# Patient Record
Sex: MALE | Race: ASIAN | Employment: FULL TIME | URBAN - METROPOLITAN AREA
[De-identification: names, ages, dates, MRNs, and addresses within clinical notes are randomized per-mention and may not be internally consistent; named-entity substitution may affect disease eponyms.]

---

## 2017-02-27 RX ORDER — PAROXETINE HYDROCHLORIDE 40 MG/1
TABLET, FILM COATED ORAL
Qty: 90 TABLET | Refills: 0 | Status: SHIPPED | OUTPATIENT
Start: 2017-02-27 | End: 2017-06-17

## 2017-06-13 ENCOUNTER — TELEPHONE (OUTPATIENT)
Dept: INTERNAL MEDICINE CLINIC | Facility: CLINIC | Age: 32
End: 2017-06-13

## 2017-06-13 NOTE — TELEPHONE ENCOUNTER
Pharmacy called in for refill on Rx Paroxetine 40 and RX Invokana 300MG please advise           Current outpatient prescriptions:   •  PAROXETINE HCL 40 MG Oral Tab, TAKE 1 TABLET BY MOUTH EVERY MORNING, Disp: 90 tablet, Rfl: 0

## 2017-06-17 NOTE — TELEPHONE ENCOUNTER
Refill Protocol Appointment Criteria  · Appointment scheduled in the past 6 months or in the next 3 months  Recent Visits       Provider Department Primary Dx    1 year ago Joi Ronquillo MD [facility], [place], [place] Generalized anxiety

## 2017-06-17 NOTE — TELEPHONE ENCOUNTER
Pt called & stated he already made appt with DR Ramesh Chavarria, he req 2 pills till his ov on monday  Pt is out of meds of paroxetine 40 mg.  rx pended. Pt LOV 4-7-16. pls advise, thanks.      Future Appointments  Date Time Provider Sarah Fuentes   6/19/

## 2017-06-19 ENCOUNTER — OFFICE VISIT (OUTPATIENT)
Dept: INTERNAL MEDICINE CLINIC | Facility: CLINIC | Age: 32
End: 2017-06-19

## 2017-06-19 VITALS
BODY MASS INDEX: 31.08 KG/M2 | DIASTOLIC BLOOD PRESSURE: 80 MMHG | SYSTOLIC BLOOD PRESSURE: 124 MMHG | RESPIRATION RATE: 18 BRPM | HEART RATE: 92 BPM | WEIGHT: 198 LBS | HEIGHT: 67 IN

## 2017-06-19 DIAGNOSIS — E78.00 PURE HYPERCHOLESTEROLEMIA: ICD-10-CM

## 2017-06-19 DIAGNOSIS — Z00.00 ANNUAL PHYSICAL EXAM: Primary | ICD-10-CM

## 2017-06-19 DIAGNOSIS — F41.1 GENERALIZED ANXIETY DISORDER: ICD-10-CM

## 2017-06-19 PROCEDURE — 99395 PREV VISIT EST AGE 18-39: CPT | Performed by: INTERNAL MEDICINE

## 2017-06-19 RX ORDER — PAROXETINE HYDROCHLORIDE 40 MG/1
TABLET, FILM COATED ORAL
Qty: 90 TABLET | Refills: 1 | Status: SHIPPED | OUTPATIENT
Start: 2017-06-19 | End: 2018-01-08

## 2017-06-19 NOTE — PROGRESS NOTES
HPI:    Patient ID: Carmen Ritter is a 36year old male. HPI Comments: Here for annual physical     Anxiety  This is a chronic problem. The current episode started more than 1 year ago. The problem occurs intermittently.  The problem has been gradually pulses. Exam reveals no gallop. No murmur heard. Pulmonary/Chest: Effort normal and breath sounds normal. No respiratory distress. He has no wheezes. He has no rales. Abdominal: Soft. Bowel sounds are normal. He exhibits no distension and no mass.  Ike Paul

## 2017-07-08 ENCOUNTER — LAB ENCOUNTER (OUTPATIENT)
Dept: LAB | Age: 32
End: 2017-07-08
Attending: INTERNAL MEDICINE
Payer: COMMERCIAL

## 2017-07-08 DIAGNOSIS — Z00.00 ANNUAL PHYSICAL EXAM: ICD-10-CM

## 2017-07-08 LAB
ALBUMIN SERPL BCP-MCNC: 4.6 G/DL (ref 3.5–4.8)
ALBUMIN/GLOB SERPL: 1.5 {RATIO} (ref 1–2)
ALP SERPL-CCNC: 83 U/L (ref 32–100)
ALT SERPL-CCNC: 37 U/L (ref 17–63)
ANION GAP SERPL CALC-SCNC: 10 MMOL/L (ref 0–18)
AST SERPL-CCNC: 29 U/L (ref 15–41)
BASOPHILS # BLD: 0 K/UL (ref 0–0.2)
BASOPHILS NFR BLD: 1 %
BILIRUB SERPL-MCNC: 0.8 MG/DL (ref 0.3–1.2)
BUN SERPL-MCNC: 11 MG/DL (ref 8–20)
BUN/CREAT SERPL: 11.7 (ref 10–20)
CALCIUM SERPL-MCNC: 9.6 MG/DL (ref 8.5–10.5)
CHLORIDE SERPL-SCNC: 101 MMOL/L (ref 95–110)
CHOLEST SERPL-MCNC: 224 MG/DL (ref 110–200)
CO2 SERPL-SCNC: 26 MMOL/L (ref 22–32)
CREAT SERPL-MCNC: 0.94 MG/DL (ref 0.5–1.5)
EOSINOPHIL # BLD: 0.3 K/UL (ref 0–0.7)
EOSINOPHIL NFR BLD: 6 %
ERYTHROCYTE [DISTWIDTH] IN BLOOD BY AUTOMATED COUNT: 13.5 % (ref 11–15)
GLOBULIN PLAS-MCNC: 3 G/DL (ref 2.5–3.7)
GLUCOSE SERPL-MCNC: 94 MG/DL (ref 70–99)
HCT VFR BLD AUTO: 49.4 % (ref 41–52)
HDLC SERPL-MCNC: 46 MG/DL
HGB BLD-MCNC: 16.9 G/DL (ref 13.5–17.5)
LDLC SERPL CALC-MCNC: 153 MG/DL (ref 0–99)
LYMPHOCYTES # BLD: 1.8 K/UL (ref 1–4)
LYMPHOCYTES NFR BLD: 33 %
MCH RBC QN AUTO: 28.9 PG (ref 27–32)
MCHC RBC AUTO-ENTMCNC: 34.2 G/DL (ref 32–37)
MCV RBC AUTO: 84.5 FL (ref 80–100)
MONOCYTES # BLD: 0.4 K/UL (ref 0–1)
MONOCYTES NFR BLD: 8 %
NEUTROPHILS # BLD AUTO: 2.9 K/UL (ref 1.8–7.7)
NEUTROPHILS NFR BLD: 53 %
NONHDLC SERPL-MCNC: 178 MG/DL
OSMOLALITY UR CALC.SUM OF ELEC: 283 MOSM/KG (ref 275–295)
PLATELET # BLD AUTO: 242 K/UL (ref 140–400)
PMV BLD AUTO: 9 FL (ref 7.4–10.3)
POTASSIUM SERPL-SCNC: 3.8 MMOL/L (ref 3.3–5.1)
PROT SERPL-MCNC: 7.6 G/DL (ref 5.9–8.4)
RBC # BLD AUTO: 5.84 M/UL (ref 4.5–5.9)
SODIUM SERPL-SCNC: 137 MMOL/L (ref 136–144)
TRIGL SERPL-MCNC: 126 MG/DL (ref 1–149)
WBC # BLD AUTO: 5.4 K/UL (ref 4–11)

## 2017-07-08 PROCEDURE — 80053 COMPREHEN METABOLIC PANEL: CPT

## 2017-07-08 PROCEDURE — 80061 LIPID PANEL: CPT

## 2017-07-08 PROCEDURE — 85025 COMPLETE CBC W/AUTO DIFF WBC: CPT

## 2017-07-08 PROCEDURE — 36415 COLL VENOUS BLD VENIPUNCTURE: CPT

## 2017-08-03 ENCOUNTER — TELEPHONE (OUTPATIENT)
Dept: INTERNAL MEDICINE CLINIC | Facility: CLINIC | Age: 32
End: 2017-08-03

## 2017-08-03 DIAGNOSIS — E78.00 ELEVATED CHOLESTEROL: Primary | ICD-10-CM

## 2017-08-03 NOTE — TELEPHONE ENCOUNTER
----- Message from Patricio Arevalo MD sent at 7/24/2017  9:48 AM CDT -----  Call pt; had sent him mychart result note and still not read it.

## 2017-08-03 NOTE — TELEPHONE ENCOUNTER
Patient Result Comments     Written by Catherine Flores MD on 7/10/2017  9:37 AM   GA Martínez,     Here are your labs.    Your chemistries were normal.   Your cholesterol levels are improving though still not at goal so continue with regular exercise, an

## 2017-08-12 NOTE — TELEPHONE ENCOUNTER
Reviewed lab results 7/8/17 with pt along with Dr Darby Escobar recommendations. Pt verbalized undestanding.

## 2018-01-08 RX ORDER — PAROXETINE HYDROCHLORIDE 40 MG/1
TABLET, FILM COATED ORAL
Qty: 90 TABLET | Refills: 1 | Status: SHIPPED | OUTPATIENT
Start: 2018-01-08 | End: 2018-08-06

## 2018-01-08 NOTE — TELEPHONE ENCOUNTER
Pharmacy requesting refill for     Current Outpatient Prescriptions:  PARoxetine HCl 40 MG Oral Tab TAKE 1 TABLET BY MOUTH EVERY MORNING Disp: 90 tablet Rfl: 1     Please Advise

## 2018-08-09 RX ORDER — PAROXETINE HYDROCHLORIDE 40 MG/1
TABLET, FILM COATED ORAL
Qty: 30 TABLET | Refills: 0 | Status: SHIPPED | OUTPATIENT
Start: 2018-08-09 | End: 2018-09-09

## 2018-09-10 RX ORDER — PAROXETINE HYDROCHLORIDE 40 MG/1
TABLET, FILM COATED ORAL
Qty: 30 TABLET | Refills: 0 | Status: SHIPPED | OUTPATIENT
Start: 2018-09-10 | End: 2018-10-12

## 2018-10-14 RX ORDER — PAROXETINE HYDROCHLORIDE 40 MG/1
TABLET, FILM COATED ORAL
Qty: 30 TABLET | Refills: 0 | Status: SHIPPED | OUTPATIENT
Start: 2018-10-14 | End: 2018-11-13

## 2018-11-13 RX ORDER — PAROXETINE HYDROCHLORIDE 40 MG/1
TABLET, FILM COATED ORAL
Qty: 30 TABLET | Refills: 0 | Status: SHIPPED | OUTPATIENT
Start: 2018-11-13 | End: 2018-12-17

## 2018-12-17 RX ORDER — PAROXETINE HYDROCHLORIDE 40 MG/1
TABLET, FILM COATED ORAL
Qty: 30 TABLET | Refills: 0 | Status: SHIPPED | OUTPATIENT
Start: 2018-12-17 | End: 2019-01-20

## 2019-01-21 NOTE — TELEPHONE ENCOUNTER
CSS, please contact patient and assist in scheduling overdue f/u or Px. Once scheduled or after 3 attempts please route back for refill review. [employer] message also sent.     Per TANNA's  12/17/18 message from last refill, pt needs appt:    Diana Luz

## 2019-01-24 RX ORDER — PAROXETINE HYDROCHLORIDE 40 MG/1
TABLET, FILM COATED ORAL
Qty: 30 TABLET | Refills: 0 | Status: SHIPPED | OUTPATIENT
Start: 2019-01-24 | End: 2019-02-24

## 2019-01-25 NOTE — TELEPHONE ENCOUNTER
Refill passed per [facility], [facility] protocol.     Refill Protocol Appointment Criteria  · Appointment scheduled in the past 6 months or in the next 3 months  Recent Outpatient Visits            1 year ago Annual physical exam    [address]

## 2019-02-25 RX ORDER — PAROXETINE HYDROCHLORIDE 40 MG/1
TABLET, FILM COATED ORAL
Qty: 30 TABLET | Refills: 0 | Status: SHIPPED | OUTPATIENT
Start: 2019-02-25 | End: 2019-04-05

## 2019-04-02 RX ORDER — PAROXETINE HYDROCHLORIDE 40 MG/1
TABLET, FILM COATED ORAL
Qty: 90 TABLET | Refills: 0 | OUTPATIENT
Start: 2019-04-02

## 2019-04-05 ENCOUNTER — OFFICE VISIT (OUTPATIENT)
Dept: INTERNAL MEDICINE CLINIC | Facility: CLINIC | Age: 34
End: 2019-04-05
Payer: COMMERCIAL

## 2019-04-05 VITALS
SYSTOLIC BLOOD PRESSURE: 118 MMHG | HEIGHT: 67 IN | OXYGEN SATURATION: 97 % | RESPIRATION RATE: 18 BRPM | HEART RATE: 81 BPM | TEMPERATURE: 98 F | WEIGHT: 200 LBS | DIASTOLIC BLOOD PRESSURE: 76 MMHG | BODY MASS INDEX: 31.39 KG/M2

## 2019-04-05 DIAGNOSIS — F41.1 GENERALIZED ANXIETY DISORDER: ICD-10-CM

## 2019-04-05 DIAGNOSIS — Z00.00 ANNUAL PHYSICAL EXAM: Primary | ICD-10-CM

## 2019-04-05 PROCEDURE — 99395 PREV VISIT EST AGE 18-39: CPT | Performed by: INTERNAL MEDICINE

## 2019-04-05 RX ORDER — PAROXETINE HYDROCHLORIDE 40 MG/1
TABLET, FILM COATED ORAL
Qty: 90 TABLET | Refills: 1 | Status: SHIPPED | OUTPATIENT
Start: 2019-04-05 | End: 2019-09-29

## 2019-04-05 NOTE — PROGRESS NOTES
HPI:    Patient ID: Yuan Batres is a 34year old male. Patient presents today for his annual physical.       Anxiety   This is a chronic problem. The current episode started more than 1 year ago. The problem has been gradually improving.  Pertinent ne encounter diagnosis)  Plan: CBC WITH DIFFERENTIAL WITH PLATELET, COMP         METABOLIC PANEL (14), LIPID PANEL        Routine labs ordered.  Pt's physical exam was normal.     (F41.1) Generalized anxiety disorder  Plan: pt had been doing well with paroxeti

## 2019-04-13 ENCOUNTER — LAB ENCOUNTER (OUTPATIENT)
Dept: LAB | Age: 34
End: 2019-04-13
Attending: INTERNAL MEDICINE
Payer: COMMERCIAL

## 2019-04-13 DIAGNOSIS — Z00.00 ANNUAL PHYSICAL EXAM: ICD-10-CM

## 2019-04-13 PROCEDURE — 85060 BLOOD SMEAR INTERPRETATION: CPT

## 2019-04-13 PROCEDURE — 36415 COLL VENOUS BLD VENIPUNCTURE: CPT

## 2019-04-13 PROCEDURE — 80053 COMPREHEN METABOLIC PANEL: CPT

## 2019-04-13 PROCEDURE — 85025 COMPLETE CBC W/AUTO DIFF WBC: CPT

## 2019-04-13 PROCEDURE — 80061 LIPID PANEL: CPT

## 2019-04-18 ENCOUNTER — TELEPHONE (OUTPATIENT)
Dept: INTERNAL MEDICINE CLINIC | Facility: CLINIC | Age: 34
End: 2019-04-18

## 2019-04-18 DIAGNOSIS — R71.8 ELEVATED RED BLOOD CELL COUNT: Primary | ICD-10-CM

## 2019-04-23 ENCOUNTER — TELEPHONE (OUTPATIENT)
Dept: OTHER | Age: 34
End: 2019-04-23

## 2019-04-23 NOTE — PROGRESS NOTES
Patient returning a call (verified name and ), advised Dr Davey Singh note and verbalized understanding.     Notes recorded by Sherren Roles, MD on 2019 at 7:52 AM CDT  Anytime this month

## 2019-05-04 ENCOUNTER — LAB ENCOUNTER (OUTPATIENT)
Dept: LAB | Age: 34
End: 2019-05-04
Attending: INTERNAL MEDICINE
Payer: COMMERCIAL

## 2019-05-04 DIAGNOSIS — R71.8 ELEVATED RED BLOOD CELL COUNT: ICD-10-CM

## 2019-05-04 PROCEDURE — 85025 COMPLETE CBC W/AUTO DIFF WBC: CPT

## 2019-05-04 PROCEDURE — 36415 COLL VENOUS BLD VENIPUNCTURE: CPT

## 2019-05-06 ENCOUNTER — TELEPHONE (OUTPATIENT)
Dept: INTERNAL MEDICINE CLINIC | Facility: CLINIC | Age: 34
End: 2019-05-06

## 2019-05-06 DIAGNOSIS — D75.1 ERYTHROCYTOSIS: Primary | ICD-10-CM

## 2019-09-29 RX ORDER — PAROXETINE HYDROCHLORIDE 40 MG/1
TABLET, FILM COATED ORAL
Qty: 90 TABLET | Refills: 1 | Status: SHIPPED | OUTPATIENT
Start: 2019-09-29 | End: 2020-04-03

## 2020-04-03 RX ORDER — PAROXETINE HYDROCHLORIDE 40 MG/1
TABLET, FILM COATED ORAL
Qty: 90 TABLET | Refills: 0 | Status: SHIPPED | OUTPATIENT
Start: 2020-04-03 | End: 2020-07-02

## 2020-07-02 ENCOUNTER — TELEPHONE (OUTPATIENT)
Dept: INTERNAL MEDICINE CLINIC | Facility: CLINIC | Age: 35
End: 2020-07-02

## 2020-07-02 RX ORDER — PAROXETINE HYDROCHLORIDE 40 MG/1
40 TABLET, FILM COATED ORAL EVERY MORNING
Qty: 30 TABLET | Refills: 0 | Status: SHIPPED | OUTPATIENT
Start: 2020-07-02 | End: 2020-08-05

## 2020-07-02 NOTE — TELEPHONE ENCOUNTER
Current Outpatient Medications:   •  PAROXETINE HCL 40 MG Oral Tab, TAKE 1 TABLET BY MOUTH EVERY MORNING, Disp: 90 tablet, Rfl: 0

## 2020-07-03 NOTE — TELEPHONE ENCOUNTER
Informed patient of note below. Patient voiced understanding and was unable to schedule appointment because he was driving but he will call back to schedule.

## 2020-07-28 NOTE — PROGRESS NOTES
HPI:    Patient ID: Luisa Bellamy is a 30year old male. Anxiety   This is a chronic problem. The current episode started more than 1 year ago. The problem occurs constantly. The problem has been waxing and waning. Nothing aggravates the symptoms.  He h has no cervical adenopathy. Neurological: He is alert and oriented to person, place, and time. Skin: Skin is warm and dry. No rash noted.    Psychiatric: His speech is normal and behavior is normal. Judgment and thought content normal. His mood appears

## 2020-08-05 ENCOUNTER — TELEPHONE (OUTPATIENT)
Dept: INTERNAL MEDICINE CLINIC | Facility: CLINIC | Age: 35
End: 2020-08-05

## 2020-08-05 NOTE — TELEPHONE ENCOUNTER
Pt states had discussed with Dr Danial Coello trying a different medication than Paroxetine. Per pt he doesn't remember which medication Dr Danial Coello recommended but would like to switch.     Please advise

## 2020-08-05 NOTE — TELEPHONE ENCOUNTER
Ok to switch to sertraline 50mg  One tablet po daily #90; call back one month for update how he is doing with new med since may need to adjust dose

## 2020-08-05 NOTE — TELEPHONE ENCOUNTER
Patient informed of provider's response/Rx that was sent to [place], as per below and patient voiced understating and agrees with all.

## 2020-08-10 ENCOUNTER — TELEPHONE (OUTPATIENT)
Dept: INTERNAL MEDICINE CLINIC | Facility: CLINIC | Age: 35
End: 2020-08-10

## 2020-08-10 NOTE — TELEPHONE ENCOUNTER
Spoke with patient ( verified)--reports he has been taking Zoloft 50 mg every morning as prescribed 2002, but after he gets to work, he experiences sweating and rapid heart rate every day--today as well-- for approximately 30 minutes, then resolves

## 2021-02-04 NOTE — PROGRESS NOTES
HPI:    Patient ID: Blu Bermeo is a 37year old male. Anxiety  This is a chronic problem. The current episode started more than 1 year ago. The problem occurs rarely. The problem has been gradually improving. Associated symptoms comments: none.  The icterus. Neck: Normal range of motion. Neck supple. No JVD present. No thyromegaly present. Cardiovascular: Normal rate, regular rhythm, normal heart sounds and intact distal pulses. Exam reveals no gallop and no friction rub. No murmur heard.   [facility]

## 2021-04-19 ENCOUNTER — TELEPHONE (OUTPATIENT)
Dept: INTERNAL MEDICINE CLINIC | Facility: CLINIC | Age: 36
End: 2021-04-19

## 2021-04-19 DIAGNOSIS — D75.1 ERYTHROCYTOSIS: Primary | ICD-10-CM

## 2021-04-19 DIAGNOSIS — E78.00 HYPERCHOLESTEREMIA: ICD-10-CM

## 2021-04-19 NOTE — TELEPHONE ENCOUNTER
I would have him do labs first since last lab done was 2 yrs ago already and would want to check if still persist or not. Lab order for his cbc in epic. I also wnt to check his cholesterol levels and cmp since elevated before. Thanks.

## 2021-04-19 NOTE — TELEPHONE ENCOUNTER
Left a detailed message to cell (ok per CRYSTAL) regarding note below. Advised to call back as needed.  Advised to fast and provided the phone # to schedule the labs

## 2021-04-19 NOTE — TELEPHONE ENCOUNTER
Patient requesting new referral to see Dr. Braeden Nielsen (Hematology)  Patient was referred back in 2019 but had no insurance coverage and unable to have consult. Please advise.

## 2021-04-26 ENCOUNTER — LAB ENCOUNTER (OUTPATIENT)
Dept: LAB | Age: 36
End: 2021-04-26
Attending: INTERNAL MEDICINE
Payer: COMMERCIAL

## 2021-04-26 DIAGNOSIS — E78.00 HYPERCHOLESTEREMIA: ICD-10-CM

## 2021-04-26 DIAGNOSIS — D75.1 ERYTHROCYTOSIS: ICD-10-CM

## 2021-04-26 PROCEDURE — 80061 LIPID PANEL: CPT

## 2021-04-26 PROCEDURE — 36415 COLL VENOUS BLD VENIPUNCTURE: CPT

## 2021-04-26 PROCEDURE — 85025 COMPLETE CBC W/AUTO DIFF WBC: CPT

## 2021-04-26 PROCEDURE — 80053 COMPREHEN METABOLIC PANEL: CPT

## 2021-08-03 ENCOUNTER — OFFICE VISIT (OUTPATIENT)
Dept: HEMATOLOGY/ONCOLOGY | Facility: HOSPITAL | Age: 36
End: 2021-08-03
Attending: INTERNAL MEDICINE
Payer: COMMERCIAL

## 2021-08-03 VITALS
RESPIRATION RATE: 18 BRPM | TEMPERATURE: 98 F | WEIGHT: 177 LBS | DIASTOLIC BLOOD PRESSURE: 72 MMHG | SYSTOLIC BLOOD PRESSURE: 134 MMHG | BODY MASS INDEX: 28.11 KG/M2 | OXYGEN SATURATION: 95 % | HEART RATE: 82 BPM | HEIGHT: 66.5 IN

## 2021-08-03 DIAGNOSIS — R68.3 CLUBBING OF FINGERS: ICD-10-CM

## 2021-08-03 DIAGNOSIS — D75.1 ERYTHROCYTOSIS: Primary | ICD-10-CM

## 2021-08-03 DIAGNOSIS — D75.1 ERYTHROCYTOSIS: ICD-10-CM

## 2021-08-03 LAB
BASOPHILS # BLD AUTO: 0.08 X10(3) UL (ref 0–0.2)
BASOPHILS NFR BLD AUTO: 1.5 %
DEPRECATED RDW RBC AUTO: 37 FL (ref 35.1–46.3)
EOSINOPHIL # BLD AUTO: 0.19 X10(3) UL (ref 0–0.7)
EOSINOPHIL NFR BLD AUTO: 3.6 %
ERYTHROCYTE [DISTWIDTH] IN BLOOD BY AUTOMATED COUNT: 12.1 % (ref 11–15)
HCT VFR BLD AUTO: 50.2 %
HGB BLD-MCNC: 17.5 G/DL
IMM GRANULOCYTES # BLD AUTO: 0.01 X10(3) UL (ref 0–1)
IMM GRANULOCYTES NFR BLD: 0.2 %
LYMPHOCYTES # BLD AUTO: 1.49 X10(3) UL (ref 1–4)
LYMPHOCYTES NFR BLD AUTO: 28.5 %
MCH RBC QN AUTO: 29.3 PG (ref 26–34)
MCHC RBC AUTO-ENTMCNC: 34.9 G/DL (ref 31–37)
MCV RBC AUTO: 83.9 FL
MONOCYTES # BLD AUTO: 0.37 X10(3) UL (ref 0.1–1)
MONOCYTES NFR BLD AUTO: 7.1 %
NEUTROPHILS # BLD AUTO: 3.09 X10 (3) UL (ref 1.5–7.7)
NEUTROPHILS # BLD AUTO: 3.09 X10(3) UL (ref 1.5–7.7)
NEUTROPHILS NFR BLD AUTO: 59.1 %
PLATELET # BLD AUTO: 236 10(3)UL (ref 150–450)
RBC # BLD AUTO: 5.98 X10(6)UL
WBC # BLD AUTO: 5.2 X10(3) UL (ref 4–11)

## 2021-08-03 PROCEDURE — 82668 ASSAY OF ERYTHROPOIETIN: CPT

## 2021-08-03 PROCEDURE — 36415 COLL VENOUS BLD VENIPUNCTURE: CPT

## 2021-08-03 PROCEDURE — 85025 COMPLETE CBC W/AUTO DIFF WBC: CPT

## 2021-08-03 PROCEDURE — 99244 OFF/OP CNSLTJ NEW/EST MOD 40: CPT | Performed by: INTERNAL MEDICINE

## 2021-08-03 RX ORDER — MULTIVITAMIN WITH IRON
1 TABLET ORAL DAILY
COMMUNITY

## 2021-08-03 RX ORDER — CHOLECALCIFEROL (VITAMIN D3) 50 MCG
TABLET ORAL DAILY
COMMUNITY

## 2021-08-04 NOTE — CONSULTS
[facility]    PATIENT'S NAME: Te Wright   CONSULTING PHYSICIAN: Gloris Meckel.  Raquel Hurtado MD   PATIENT ACCOUNT #: [de-identified] LOCATION: [address] [place] [address] RECORD #: [id number] YOB: 1985   CONSULTATION DATE: 08/03/2021       CANCER CENT pulse 82, respiratory rate 18, pulse ox 95% on room air. Temperature is 36.8. HEENT:  Moist mucous membranes. Oropharynx clear. NECK:  Supple. LUNGS:  Symmetric expansion nonlabored breathing. HEART:  Good distal pulses. Regular rate and rhythm.

## 2021-08-04 NOTE — PROGRESS NOTES
RN, please reach out to the patient to facilitate getting him an appointment to see me at some juncture within the next month or so.

## 2021-08-11 ENCOUNTER — TELEPHONE (OUTPATIENT)
Dept: PULMONOLOGY | Facility: CLINIC | Age: 36
End: 2021-08-11

## 2021-08-11 NOTE — TELEPHONE ENCOUNTER
Brook Torres MD  P  [facility] Clinical Staff  RN, please reach out to the patient to get him an appointment to see me in the office sometime within the next month or so.

## 2021-08-13 NOTE — TELEPHONE ENCOUNTER
Spoke to patient and appointment scheduled with Dr. Dianna Shelton 9/2/21 at 1 pm.  Verified date, time, place and parking. Patient verbalized understanding.      CS-please schedule Monday :)

## 2021-08-14 LAB — ERYTHROPOIETIN (EPO): 8 MU/ML

## 2021-08-17 ENCOUNTER — TELEPHONE (OUTPATIENT)
Dept: HEMATOLOGY/ONCOLOGY | Facility: HOSPITAL | Age: 36
End: 2021-08-17

## 2021-08-17 DIAGNOSIS — D75.1 ERYTHROCYTOSIS: Primary | ICD-10-CM

## 2021-08-17 NOTE — TELEPHONE ENCOUNTER
LM indicating normal epo level. Noted consult appointment with Dr Taiwo Grant as per Dr Dilip Lechuga recommendations. LM to please call clinic back for questions and also to schedule a follow up with Dr Jacquelin Mendiola in 4- 5 months with repeat cbc. Awaiting call back.

## 2021-08-18 ENCOUNTER — TELEPHONE (OUTPATIENT)
Dept: HEMATOLOGY/ONCOLOGY | Facility: HOSPITAL | Age: 36
End: 2021-08-18

## 2021-08-18 NOTE — TELEPHONE ENCOUNTER
Advised pt as per Dr Emiliana Haddad that his epo level came back normal.  Reinforced to pt that this result makes it even more important to follow up with pulmonology to determine if lowered oxygen levels in his blood are the cause of his elevated red blood cells.

## 2021-09-02 ENCOUNTER — OFFICE VISIT (OUTPATIENT)
Dept: PULMONOLOGY | Facility: CLINIC | Age: 36
End: 2021-09-02
Payer: COMMERCIAL

## 2021-09-02 VITALS
HEIGHT: 67 IN | WEIGHT: 180.19 LBS | OXYGEN SATURATION: 97 % | TEMPERATURE: 99 F | DIASTOLIC BLOOD PRESSURE: 76 MMHG | RESPIRATION RATE: 16 BRPM | BODY MASS INDEX: 28.28 KG/M2 | HEART RATE: 89 BPM | SYSTOLIC BLOOD PRESSURE: 130 MMHG

## 2021-09-02 DIAGNOSIS — R68.3 CLUBBING OF FINGERS: Primary | ICD-10-CM

## 2021-09-02 DIAGNOSIS — G47.33 OSA (OBSTRUCTIVE SLEEP APNEA): ICD-10-CM

## 2021-09-02 DIAGNOSIS — D75.1 POLYCYTHEMIA: ICD-10-CM

## 2021-09-02 PROCEDURE — 3075F SYST BP GE 130 - 139MM HG: CPT | Performed by: INTERNAL MEDICINE

## 2021-09-02 PROCEDURE — 3008F BODY MASS INDEX DOCD: CPT | Performed by: INTERNAL MEDICINE

## 2021-09-02 PROCEDURE — 99243 OFF/OP CNSLTJ NEW/EST LOW 30: CPT | Performed by: INTERNAL MEDICINE

## 2021-09-02 PROCEDURE — 3078F DIAST BP <80 MM HG: CPT | Performed by: INTERNAL MEDICINE

## 2021-09-02 NOTE — PROGRESS NOTES
Dear Delia Ronquillo:           As you know, Rona Mahan is a 45-year-old male who I am now evaluating for erythrocytosis and clubbing. HISTORY OF PRESENT ILLNESS: The patient is a history of dyslipidemia and anxiety; however, he is otherwise well.   He was recent Examination:  Vital signs normal. HEENT examination is unremarkable with pupils equal round and reactive to light and accommodation. Neck without adenopathy, thyromegaly, JVD nor bruit. Lungs clear to auscultation and percussion.  Cardiac regular rate and r

## 2021-09-04 ENCOUNTER — HOSPITAL ENCOUNTER (OUTPATIENT)
Dept: GENERAL RADIOLOGY | Age: 36
Discharge: HOME | End: 2021-09-04
Attending: INTERNAL MEDICINE
Payer: COMMERCIAL

## 2021-09-04 DIAGNOSIS — R68.3 CLUBBING OF FINGERS: ICD-10-CM

## 2021-09-04 DIAGNOSIS — D75.1 POLYCYTHEMIA: ICD-10-CM

## 2021-09-04 PROCEDURE — 71046 X-RAY EXAM CHEST 2 VIEWS: CPT | Performed by: INTERNAL MEDICINE

## 2021-09-13 ENCOUNTER — TELEPHONE (OUTPATIENT)
Dept: PULMONOLOGY | Facility: CLINIC | Age: 36
End: 2021-09-13

## 2021-09-13 DIAGNOSIS — G47.33 OSA (OBSTRUCTIVE SLEEP APNEA): Primary | ICD-10-CM

## 2021-09-13 NOTE — TELEPHONE ENCOUNTER
Spoke with Kaitlin Leonard at the sleep center regarding denial of diagnostic sleep study. Kaitlin Leonard states home sleep study would be approved. Dr. Cecilio Araujo- Please review/sign pended order for home sleep study if agreeable.

## 2021-09-13 NOTE — TELEPHONE ENCOUNTER
Pt called to let this office know that his sleep study was denied by insurance. Please to discuss other options.

## 2021-09-13 NOTE — TELEPHONE ENCOUNTER
Spoke with patient. Informed patient of Dr. April Whiting order below. Patient states he already scheduled home sleep study.

## 2022-02-04 NOTE — TELEPHONE ENCOUNTER
Refill passed per [address] protocol.     Requested Prescriptions   Pending Prescriptions Disp Refills    SERTRALINE 50 MG Oral Tab [Pharmacy Med Name: SERTRALINE 50MG TABLETS] 90 tablet 0     Sig: TAKE 1 TABLET(50 MG) BY MOUTH DAILY        Psychiatric Non-Scheduled (Anti-Anxiety) Passed - 2/4/2022  5:06 PM        Passed - Appointment in last 6 or next 3 months              Future Appointments         Provider Department Appt Notes    In 1 week Angelika Rodriguez MD [address] [facility] [address], [address], [address] *[place]            Recent Outpatient Visits              5 months ago Clubbing of fingers    [facility], [facility], Queenie Horta MD    Office Visit    6 months ago Erythrocytosis    [facility] Hematology Oncology    Nurse Only    6 months ago Erythrocytosis    [facility] Hematology Oncology Denver Lopes, MD    Office Visit    1 year ago Generalized anxiety disorder    Bo Do MD    Office Visit    1 year ago Generalized anxiety disorder    Bo Do MD    Office Visit

## 2022-02-16 ENCOUNTER — OFFICE VISIT (OUTPATIENT)
Dept: INTERNAL MEDICINE CLINIC | Facility: CLINIC | Age: 37
End: 2022-02-16
Payer: COMMERCIAL

## 2022-02-16 VITALS
SYSTOLIC BLOOD PRESSURE: 120 MMHG | OXYGEN SATURATION: 98 % | TEMPERATURE: 99 F | HEART RATE: 82 BPM | DIASTOLIC BLOOD PRESSURE: 68 MMHG | WEIGHT: 183.63 LBS | HEIGHT: 67 IN | BODY MASS INDEX: 28.82 KG/M2

## 2022-02-16 DIAGNOSIS — R68.3 CLUBBING OF NAILS: ICD-10-CM

## 2022-02-16 DIAGNOSIS — D75.1 ERYTHROCYTOSIS: ICD-10-CM

## 2022-02-16 DIAGNOSIS — E78.00 HYPERCHOLESTEREMIA: ICD-10-CM

## 2022-02-16 DIAGNOSIS — F41.1 GENERALIZED ANXIETY DISORDER: ICD-10-CM

## 2022-02-16 DIAGNOSIS — Z00.00 ANNUAL PHYSICAL EXAM: Primary | ICD-10-CM

## 2022-02-16 PROCEDURE — 99395 PREV VISIT EST AGE 18-39: CPT | Performed by: INTERNAL MEDICINE

## 2022-02-16 PROCEDURE — 3008F BODY MASS INDEX DOCD: CPT | Performed by: INTERNAL MEDICINE

## 2022-02-16 PROCEDURE — 3074F SYST BP LT 130 MM HG: CPT | Performed by: INTERNAL MEDICINE

## 2022-02-16 PROCEDURE — 3078F DIAST BP <80 MM HG: CPT | Performed by: INTERNAL MEDICINE

## 2022-02-16 PROCEDURE — 90471 IMMUNIZATION ADMIN: CPT | Performed by: INTERNAL MEDICINE

## 2022-02-16 PROCEDURE — 90686 IIV4 VACC NO PRSV 0.5 ML IM: CPT | Performed by: INTERNAL MEDICINE

## 2022-04-15 ENCOUNTER — NURSE TRIAGE (OUTPATIENT)
Dept: INTERNAL MEDICINE CLINIC | Facility: CLINIC | Age: 37
End: 2022-04-15

## 2022-04-15 NOTE — TELEPHONE ENCOUNTER
Patient indicated that Dr Velma Marrero gave him a cream in the office before for his eczema on his right arm/elbow. Patient is now having eczema on his left forearm and elbow. Does not have anymore cream left and not sure of the name. Please advise.

## 2022-06-08 ENCOUNTER — LAB ENCOUNTER (OUTPATIENT)
Dept: LAB | Age: 37
End: 2022-06-08
Attending: INTERNAL MEDICINE
Payer: COMMERCIAL

## 2022-06-08 DIAGNOSIS — D75.1 ERYTHROCYTOSIS: ICD-10-CM

## 2022-06-08 DIAGNOSIS — Z00.00 ANNUAL PHYSICAL EXAM: ICD-10-CM

## 2022-06-08 LAB
ALBUMIN SERPL-MCNC: 4 G/DL (ref 3.4–5)
ALBUMIN/GLOB SERPL: 1.2 {RATIO} (ref 1–2)
ALP LIVER SERPL-CCNC: 78 U/L
ALT SERPL-CCNC: 37 U/L
ANION GAP SERPL CALC-SCNC: 3 MMOL/L (ref 0–18)
AST SERPL-CCNC: 27 U/L (ref 15–37)
BASOPHILS # BLD AUTO: 0.06 X10(3) UL (ref 0–0.2)
BASOPHILS NFR BLD AUTO: 1.7 %
BILIRUB SERPL-MCNC: 0.8 MG/DL (ref 0.1–2)
BUN BLD-MCNC: 13 MG/DL (ref 7–18)
BUN/CREAT SERPL: 13.8 (ref 10–20)
CALCIUM BLD-MCNC: 9 MG/DL (ref 8.5–10.1)
CHLORIDE SERPL-SCNC: 108 MMOL/L (ref 98–112)
CHOLEST SERPL-MCNC: 168 MG/DL (ref ?–200)
CO2 SERPL-SCNC: 30 MMOL/L (ref 21–32)
CREAT BLD-MCNC: 0.94 MG/DL
DEPRECATED RDW RBC AUTO: 38.5 FL (ref 35.1–46.3)
EOSINOPHIL # BLD AUTO: 0.24 X10(3) UL (ref 0–0.7)
EOSINOPHIL NFR BLD AUTO: 6.6 %
ERYTHROCYTE [DISTWIDTH] IN BLOOD BY AUTOMATED COUNT: 12.7 % (ref 11–15)
FASTING PATIENT LIPID ANSWER: YES
FASTING STATUS PATIENT QL REPORTED: YES
GLOBULIN PLAS-MCNC: 3.3 G/DL (ref 2.8–4.4)
GLUCOSE BLD-MCNC: 89 MG/DL (ref 70–99)
HCT VFR BLD AUTO: 49.8 %
HDLC SERPL-MCNC: 60 MG/DL (ref 40–59)
HGB BLD-MCNC: 17 G/DL
IMM GRANULOCYTES # BLD AUTO: 0.01 X10(3) UL (ref 0–1)
IMM GRANULOCYTES NFR BLD: 0.3 %
LDLC SERPL CALC-MCNC: 101 MG/DL (ref ?–100)
LYMPHOCYTES # BLD AUTO: 1.34 X10(3) UL (ref 1–4)
LYMPHOCYTES NFR BLD AUTO: 36.9 %
MCH RBC QN AUTO: 29 PG (ref 26–34)
MCHC RBC AUTO-ENTMCNC: 34.1 G/DL (ref 31–37)
MCV RBC AUTO: 84.8 FL
MONOCYTES # BLD AUTO: 0.4 X10(3) UL (ref 0.1–1)
MONOCYTES NFR BLD AUTO: 11 %
NEUTROPHILS # BLD AUTO: 1.58 X10 (3) UL (ref 1.5–7.7)
NEUTROPHILS # BLD AUTO: 1.58 X10(3) UL (ref 1.5–7.7)
NEUTROPHILS NFR BLD AUTO: 43.5 %
NONHDLC SERPL-MCNC: 108 MG/DL (ref ?–130)
OSMOLALITY SERPL CALC.SUM OF ELEC: 292 MOSM/KG (ref 275–295)
PLATELET # BLD AUTO: 225 10(3)UL (ref 150–450)
POTASSIUM SERPL-SCNC: 4 MMOL/L (ref 3.5–5.1)
PROT SERPL-MCNC: 7.3 G/DL (ref 6.4–8.2)
RBC # BLD AUTO: 5.87 X10(6)UL
SODIUM SERPL-SCNC: 141 MMOL/L (ref 136–145)
TRIGL SERPL-MCNC: 31 MG/DL (ref 30–149)
TSI SER-ACNC: 0.83 MIU/ML (ref 0.36–3.74)
VLDLC SERPL CALC-MCNC: 5 MG/DL (ref 0–30)
WBC # BLD AUTO: 3.6 X10(3) UL (ref 4–11)

## 2022-06-08 PROCEDURE — 80053 COMPREHEN METABOLIC PANEL: CPT

## 2022-06-08 PROCEDURE — 36415 COLL VENOUS BLD VENIPUNCTURE: CPT

## 2022-06-08 PROCEDURE — 80061 LIPID PANEL: CPT

## 2022-06-08 PROCEDURE — 84443 ASSAY THYROID STIM HORMONE: CPT

## 2022-06-08 PROCEDURE — 85025 COMPLETE CBC W/AUTO DIFF WBC: CPT

## 2022-08-05 NOTE — TELEPHONE ENCOUNTER
2nd attempt - LMTCB; see notes below when call is returned to schedule an appointment for a follow-up per Dr. Debi Wills.

## 2022-08-19 ENCOUNTER — TELEPHONE (OUTPATIENT)
Dept: PULMONOLOGY | Facility: CLINIC | Age: 37
End: 2022-08-19

## 2022-08-19 DIAGNOSIS — G47.33 OSA (OBSTRUCTIVE SLEEP APNEA): Primary | ICD-10-CM

## 2022-08-22 NOTE — TELEPHONE ENCOUNTER
Spoke with patient and informed him of Dr. Chrystine Buerger order below. Provided number for [facility] [phone]. Patient verbalized understanding.

## 2022-08-22 NOTE — TELEPHONE ENCOUNTER
Spoke with patient. Patient states he did not get home sleep study done because he thought it was denied, requesting new order for home sleep study. Last office visit 9/2/21. Dr. Brown Overcast- Please review/sign pended.

## 2022-11-08 NOTE — TELEPHONE ENCOUNTER
Refill passed per [facility] protocol. Requested Prescriptions   Pending Prescriptions Disp Refills    sertraline 50 MG Oral Tab 90 tablet 1     Sig: Take 1 tablet (50 mg total) by mouth in the morning.        Psychiatric Non-Scheduled (Anti-Anxiety) Passed - 11/8/2022 10:43 AM        Passed - In person appointment or virtual visit in the past 6 mos or appointment in next 3 mos     Recent Outpatient Visits              3 months ago Generalized anxiety disorder    [address] Daniel[address] Hodge MD    Office Visit    8 months ago Annual physical exam    [address] MD    Office Visit    1 year ago Clubbing of fingers    [facility], [address] [place] [address], alba [place]IZ, Frankie Mon MD    Office Visit    1 year ago Erythrocytosis    [facility] Hematology Oncology    Nurse Only    1 year ago Erythrocytosis    [facility] Hematology Oncology Mark Muse MD    Office Visit          Future Appointments         Provider Department Appt Notes    In 1 week [address] [facility] [address] july [address]ova rerox Ref# [id number] Rep Mohan Schwab

## 2022-11-15 ENCOUNTER — OFFICE VISIT (OUTPATIENT)
Dept: SLEEP CENTER | Age: 37
End: 2022-11-15
Attending: INTERNAL MEDICINE
Payer: COMMERCIAL

## 2022-11-15 DIAGNOSIS — G47.33 OSA (OBSTRUCTIVE SLEEP APNEA): ICD-10-CM

## 2022-11-15 PROCEDURE — 95806 SLEEP STUDY UNATT&RESP EFFT: CPT

## 2022-11-17 NOTE — PROCEDURES
[facility]  Accredited by the [employer] ([employer])    PATIENT'S NAME: Coretta Montelongo   ATTENDING PHYSICIAN: Teresa Trinidad MD   REFERRING PHYSICIAN: Teresa Trinidad MD   PATIENT ACCOUNT #: [de-identified] LOCATION: [address] #: [id number] YOB: 1985   DATE OF STUDY: 11/15/2022       SLEEP STUDY REPORT    STUDY TYPE:  Home sleep test.    INDICATION:  Suspected obstructive sleep apnea (ICD-10 code G47.33) in a patient with snoring, apnea, hypersomnia, excessive daytime somnolence, rare drowsy driving, unrefreshing sleep, an Florence Sleepiness Scale score of 7/24, and body mass index 27.4. RESULTS:  The patient underwent home sleep test with measurement of his nasal airflow, nasal air pressure, snoring, chest and abdominal wall motion, oximetry, and body position. I have reviewed the entirety of the raw data of this study. During the study, the total recording time is 355 minutes. The lights-out clock time is 8:04 p.m., lights-on clock time is 1:59 a.m. There were 4 apneic events of which all were obstructive in character for an apnea index of 0.7 events per hour. There were 14 hypopneic events for a hypopnea index of 2.4 events per hour. The combined apnea plus hypopnea index was 3.0 events per hour. There was no significant hypoventilation, Cheyne-Serrano breathing, or periodic breathing. The average oxygen saturation was 94%, the lowest oxygen saturation is 91%, and the average desaturation is 4.1%. The oxygen desaturation index is 2.9 events per hour. Snoring was appreciated. The patient spent 196 minutes in the supine position, equivalent to 55% of the testing, and 159 minutes in the non-supine position, equivalent to 45% of the testing. The average heart rate is 65 beats per minute. INTERPRETATION:  The data generated from this study shows no evidence of significant sleep-disordered breathing.   Snoring was appreciated. RECOMMENDATIONS:    1. Clinical correlation is required. 2.   Avoid alcohol. 3.   Avoid sedating drug. 4.   Patient should not drive if at all sleepy. Please do not hesitate to contact me if there is any question whatsoever regarding interpretation of this study. Dictated By Althea Owen MD  d: 11/16/2022 16:45:35  t: 11/16/2022 18:03:53  Job [id number]  PJC/    cc: MD Althea Macdonald MD

## 2022-11-22 ENCOUNTER — TELEPHONE (OUTPATIENT)
Dept: PULMONOLOGY | Facility: CLINIC | Age: 37
End: 2022-11-22

## 2022-11-22 NOTE — TELEPHONE ENCOUNTER
Spoke with patient and informed of Dr. Gibson Russian message below. Patient verbalized understanding.

## 2022-11-22 NOTE — TELEPHONE ENCOUNTER
----- Message from Steven Ibarra MD sent at 11/17/2022  2:39 PM CST -----  RN, please call the patient to let him know that his home sleep test shows no evidence of significant sleep apnea.   Selvin Torres

## 2023-01-13 ENCOUNTER — OFFICE VISIT (OUTPATIENT)
Dept: INTERNAL MEDICINE CLINIC | Facility: CLINIC | Age: 38
End: 2023-01-13

## 2023-01-13 ENCOUNTER — TELEPHONE (OUTPATIENT)
Dept: INTERNAL MEDICINE CLINIC | Facility: CLINIC | Age: 38
End: 2023-01-13

## 2023-01-13 VITALS
HEIGHT: 67 IN | BODY MASS INDEX: 28.51 KG/M2 | SYSTOLIC BLOOD PRESSURE: 120 MMHG | OXYGEN SATURATION: 97 % | HEART RATE: 83 BPM | TEMPERATURE: 99 F | DIASTOLIC BLOOD PRESSURE: 70 MMHG | WEIGHT: 181.63 LBS

## 2023-01-13 DIAGNOSIS — Z02.0 SCHOOL PHYSICAL EXAM: Primary | ICD-10-CM

## 2023-01-13 PROCEDURE — 3074F SYST BP LT 130 MM HG: CPT | Performed by: INTERNAL MEDICINE

## 2023-01-13 PROCEDURE — 99395 PREV VISIT EST AGE 18-39: CPT | Performed by: INTERNAL MEDICINE

## 2023-01-13 PROCEDURE — 3008F BODY MASS INDEX DOCD: CPT | Performed by: INTERNAL MEDICINE

## 2023-01-13 PROCEDURE — 3078F DIAST BP <80 MM HG: CPT | Performed by: INTERNAL MEDICINE

## 2023-01-13 NOTE — TELEPHONE ENCOUNTER
Pt has form from school requesting specific lab orders. Pt was seen today in office by Dr Bethany Galicia pt to drop form off at [facility] from desk for MD to address or send thru his active mychart. Pt decided with go by [facility] office today.      Please reply to mundo: EM RN Nelma Spurling

## 2023-01-13 NOTE — TELEPHONE ENCOUNTER
Patient is requesting to speak with Dr. Monie Sanders RN. Patient declined to provide additional information.

## 2023-01-14 NOTE — TELEPHONE ENCOUNTER
Pt dropped off health requirement forms at [facility] location.   Forms left in doctor's bin  Please advise

## 2023-01-19 ENCOUNTER — NURSE ONLY (OUTPATIENT)
Dept: INTERNAL MEDICINE CLINIC | Facility: CLINIC | Age: 38
End: 2023-01-19

## 2023-01-19 DIAGNOSIS — Z23 IMMUNIZATION DUE: Primary | ICD-10-CM

## 2023-01-19 PROCEDURE — 90715 TDAP VACCINE 7 YRS/> IM: CPT | Performed by: INTERNAL MEDICINE

## 2023-01-19 PROCEDURE — 90686 IIV4 VACC NO PRSV 0.5 ML IM: CPT | Performed by: INTERNAL MEDICINE

## 2023-01-19 PROCEDURE — 90471 IMMUNIZATION ADMIN: CPT | Performed by: INTERNAL MEDICINE

## 2023-01-19 PROCEDURE — 90472 IMMUNIZATION ADMIN EACH ADD: CPT | Performed by: INTERNAL MEDICINE

## 2023-01-19 NOTE — PROGRESS NOTES
Pt presents for nurse visit for flu vaccine and TDAP vaccine. Pt received flu vaccine to right deltoid, pt tolerated injection well, no reactions noted at this time. Pt provided with vaccine information sheet. Vaccine verified by MT. Pt received TDAP vaccine to left deltoid, pt tolerated injection well, no reactions noted at this time. Pt provided with vaccine information sheet.  Vaccine verified by MT.

## 2023-01-25 ENCOUNTER — LAB ENCOUNTER (OUTPATIENT)
Dept: LAB | Age: 38
End: 2023-01-25
Attending: INTERNAL MEDICINE
Payer: COMMERCIAL

## 2023-01-25 DIAGNOSIS — Z11.1 SCREENING FOR TUBERCULOSIS: ICD-10-CM

## 2023-01-25 DIAGNOSIS — Z11.9 SCREENING EXAMINATION FOR INFECTIOUS DISEASE: ICD-10-CM

## 2023-01-25 LAB
HBV SURFACE AB SER QL: REACTIVE
HBV SURFACE AB SERPL IA-ACNC: 56.34 MIU/ML
RUBV IGG SER QL: POSITIVE
RUBV IGG SER-ACNC: >500 IU/ML (ref 10–?)

## 2023-01-25 PROCEDURE — 86765 RUBEOLA ANTIBODY: CPT

## 2023-01-25 PROCEDURE — 86762 RUBELLA ANTIBODY: CPT

## 2023-01-25 PROCEDURE — 36415 COLL VENOUS BLD VENIPUNCTURE: CPT

## 2023-01-25 PROCEDURE — 86480 TB TEST CELL IMMUN MEASURE: CPT

## 2023-01-25 PROCEDURE — 86735 MUMPS ANTIBODY: CPT

## 2023-01-25 PROCEDURE — 86706 HEP B SURFACE ANTIBODY: CPT

## 2023-01-25 PROCEDURE — 86787 VARICELLA-ZOSTER ANTIBODY: CPT

## 2023-01-27 LAB
M TB IFN-G CD4+ T-CELLS BLD-ACNC: 0.08 IU/ML
M TB TUBERC IFN-G BLD QL: NEGATIVE
M TB TUBERC IGNF/MITOGEN IGNF CONTROL: >10 IU/ML
MEV IGG SER-ACNC: >300 AU/ML (ref 16.5–?)
MUV IGG SER IA-ACNC: >300 AU/ML (ref 11–?)
QFT TB1 AG MINUS NIL: -0.01 IU/ML
QFT TB2 AG MINUS NIL: 0.02 IU/ML
VZV IGG SER IA-ACNC: 3187 (ref 165–?)

## 2023-02-01 ENCOUNTER — APPOINTMENT (OUTPATIENT)
Dept: OCCUPATIONAL MEDICINE | Age: 38
End: 2023-02-01
Attending: FAMILY MEDICINE

## 2023-05-17 NOTE — TELEPHONE ENCOUNTER
Refill passed per [facility], [facility] protocol. Requested Prescriptions   Pending Prescriptions Disp Refills    sertraline 50 MG Oral Tab 90 tablet 1     Sig: Take 1 tablet (50 mg total) by mouth daily.        Psychiatric Non-Scheduled (Anti-Anxiety) Passed - 5/17/2023  2:46 PM        Passed - In person appointment or virtual visit in the past 6 mos or appointment in next 3 mos     Recent Outpatient Visits              3 months ago Immunization due    [address], [place]    Nurse Only    3 months ago     [address], [address], Chencho Yoo MD    Office Visit    4 months ago School physical exam    [address], Joaquín Ortiz MD    Office Visit    6 months ago CATHERINE (obstructive sleep apnea)    [facility]    Office Visit    9 months ago Generalized anxiety disorder    [facility], [address], Chencho Yoo MD    Office Visit                               Recent Outpatient Visits              3 months ago Immunization due    [address], [place]    Nurse Only    3 months ago     [address], [place], Chencho Yoo MD    Office Visit    4 months ago School physical exam    [address], Chencho Yoo MD    Office Visit    6 months ago CATHERINE (obstructive sleep apnea)    [facility]    Office Visit    9 months ago Generalized anxiety disorder    [address], Chencho Yoo MD    Office Visit

## 2023-05-17 NOTE — TELEPHONE ENCOUNTER
Patient calling to as for update on Sertraline 50 MG. [facility] faxed request on 05/16/23. [facility] is completely out.

## 2024-04-30 ENCOUNTER — TELEPHONE (OUTPATIENT)
Dept: INTERNAL MEDICINE CLINIC | Facility: CLINIC | Age: 39
End: 2024-04-30

## 2024-04-30 NOTE — TELEPHONE ENCOUNTER
Patient requesting appt with provider to discuss Sertraline.   States he feels he needs other alternatives for his anxiety.   Patient was asked by this RN if he feels safe or has any thoughts of self harm or harm to others in which patient stated no.   Appt made with provider.   Future Appointments   Date Time Provider Department Center   5/1/2024  9:30 AM Raymond Miller MD [facility]

## 2024-05-25 NOTE — TELEPHONE ENCOUNTER
Patient transported to [address] [place] [address] Gurmeet, RN  08/29/19 [id number] Patient would like a refill on Rx sertraline 50MG. Patient said he contacted his pharmacy several days ago and they told patient they are waiting for Dr. Please advise     Verified pharmacy [facility] [address] [place]         Current Outpatient Medications   Medication Sig Dispense Refill    sertraline 50 MG Oral Tab Take 1 tablet (50 mg total) by mouth daily. 90 tablet 3

## 2024-05-25 NOTE — TELEPHONE ENCOUNTER
Refill Per Protocol     Requested Prescriptions   Pending Prescriptions Disp Refills    sertraline 50 MG Oral Tab 90 tablet 3     Sig: Take 1 tablet (50 mg total) by mouth daily.       Psychiatric Non-Scheduled (Anti-Anxiety) Passed - 5/25/2024 11:24 AM        Passed - In person appointment or virtual visit in the past 6 mos or appointment in next 3 mos     Recent Outpatient Visits              3 weeks ago MARYBETH (generalized anxiety disorder)    [facility] Raymond Miller MD    Office Visit    1 year ago Immunization due    [facility]    Nurse Only    1 year ago     [facility] Raymond Miller MD    Office Visit    [facility]    [facility] Visit    1 year ago CATHERINE (obstructive sleep apnea)    [facility]    Office Visit          Future Appointments         Provider Department Appt Notes    In 4 days Raymond Miller MD [facility]                     Passed - Depression Screening completed within the past 12 months               Future Appointments         Provider Department Appt Notes    In 4 days Raymond Miller MD [facility]           Recent Outpatient Visits              3 weeks ago MARYBETH [facility]generalized anxiety disorder)    [facility]    Office Visit    1 year ago Immunization due    [facility]    Nurse Only    [facility]    [facility] Visit    1 year ago School physical exam    [facility] Raymond Mohamud MD    Office Visit    1 year ago CATHERINE (obstructive  sleep apnea)    [facility]    Office Visit

## 2024-05-29 ENCOUNTER — OFFICE VISIT (OUTPATIENT)
Dept: INTERNAL MEDICINE CLINIC | Facility: CLINIC | Age: 39
End: 2024-05-29

## 2024-05-29 VITALS
SYSTOLIC BLOOD PRESSURE: 124 MMHG | BODY MASS INDEX: 29.84 KG/M2 | OXYGEN SATURATION: 96 % | HEART RATE: 98 BPM | TEMPERATURE: 99 F | HEIGHT: 67 IN | WEIGHT: 190.13 LBS | DIASTOLIC BLOOD PRESSURE: 76 MMHG

## 2024-05-29 DIAGNOSIS — F41.1 GAD (GENERALIZED ANXIETY DISORDER): ICD-10-CM

## 2024-05-29 DIAGNOSIS — R68.3 CLUBBING OF NAILS: ICD-10-CM

## 2024-05-29 DIAGNOSIS — D75.1 ERYTHROCYTOSIS: ICD-10-CM

## 2024-05-29 DIAGNOSIS — Z00.00 ANNUAL PHYSICAL EXAM: Primary | ICD-10-CM

## 2024-05-29 PROCEDURE — 99395 PREV VISIT EST AGE 18-39: CPT | Performed by: INTERNAL MEDICINE

## 2024-05-29 PROCEDURE — 3074F SYST BP LT 130 MM HG: CPT | Performed by: INTERNAL MEDICINE

## 2024-05-29 PROCEDURE — 3078F DIAST BP <80 MM HG: CPT | Performed by: INTERNAL MEDICINE

## 2024-05-29 PROCEDURE — 3008F BODY MASS INDEX DOCD: CPT | Performed by: INTERNAL MEDICINE

## 2024-05-29 NOTE — PROGRESS NOTES
Subjective:     Patient ID: Sandro Conde is a 38 year old male.    Patient presentss today for his annual physical. No complains otherwise.         History/Other:   Review of Systems   Constitutional: Negative.  Negative for appetite change, fever and unexpected weight change.   HENT: Negative.     Eyes: Negative.    Respiratory: Negative.          No hemoptyssis   Cardiovascular: Negative.  Negative for chest pain, palpitations and leg swelling.        No pnd   Gastrointestinal: Negative.         No hematemesis/dysphgia   Genitourinary: Negative.    Skin:  Negative for rash.   Allergic/Immunologic: Positive for environmental allergies. Negative for immunocompromised state.   Neurological:  Negative for syncope.   Hematological: Negative.      Current Outpatient Medications   Medication Sig Dispense Refill    sertraline 50 MG Oral Tab Take 1 tablet (50 mg total) by mouth daily. 90 tablet 3    triamcinolone 0.1 % External Cream Apply topically 2 (two) times daily as needed. 60 g 0    Multiple Vitamins Oral Tab Take 1 tablet by mouth daily.      Omega-3 Fatty Acids (FISH OIL OR) Take by mouth daily.       Allergies:  Allergies   Allergen Reactions    Seasonal OTHER (SEE COMMENTS)     Cold symptoms       Past Medical History:    Allergic rhinitis    Anxiety state, unspecified    Clubbing of digits    Seasonal allergies      History reviewed. No pertinent surgical history.   Family History   Problem Relation Age of Onset    Lipids Father     Hypertension Mother     Cancer Mother     No Known Problems Sister       Social History:   Social History     Socioeconomic History    Marital status: Single   Tobacco Use    Smoking status: Never     Passive exposure: Never    Smokeless tobacco: Never   Vaping Use    Vaping status: Never Used   Substance and Sexual Activity    Alcohol use: Yes     Alcohol/week: 0.0 standard drinks of alcohol     Comment: Beer, occasionally    Drug use: No   Other Topics Concern    Caffeine  Concern No        Objective:   Physical Exam  Constitutional:       General: He is not in acute distress.     Appearance: He is well-developed. He is not ill-appearing, toxic-appearing or diaphoretic.   HENT:      Head: Normocephalic and atraumatic.      Right Ear: Tympanic membrane, ear canal and external ear normal.      Left Ear: Tympanic membrane, ear canal and external ear normal.      Nose: Nose normal.      Mouth/Throat:      Pharynx: No oropharyngeal exudate.   Eyes:      General: No scleral icterus.        Right eye: No discharge.         Left eye: No discharge.      Conjunctiva/sclera: Conjunctivae normal.      Pupils: Pupils are equal, round, and reactive to light.   Neck:      Thyroid: No thyromegaly.      Vascular: No JVD.   Cardiovascular:      Rate and Rhythm: Normal rate and regular rhythm.      Pulses: Normal pulses.      Heart sounds: Normal heart sounds. No murmur heard.     No friction rub. No gallop.   Pulmonary:      Effort: Pulmonary effort is normal. No respiratory distress.      Breath sounds: Normal breath sounds. No wheezing or rales.   Abdominal:      General: Abdomen is flat. Bowel sounds are normal. There is no distension.      Palpations: Abdomen is soft. There is no mass.      Tenderness: There is no abdominal tenderness. There is no guarding or rebound.   Musculoskeletal:         General: Normal range of motion.      Cervical back: Normal range of motion and neck supple. No rigidity or tenderness.      Right lower leg: No edema.      Left lower leg: No edema.   Lymphadenopathy:      Cervical: No cervical adenopathy.   Skin:     General: Skin is warm and dry.      Coloration: Skin is not jaundiced or pale.      Findings: No rash.      Comments: Chronic clubbing of nails   Neurological:      Mental Status: He is alert and oriented to person, place, and time.   Psychiatric:         Mood and Affect: Mood normal.         Assessment & Plan:   (Z00.00) Annual physical exam  (primary  encounter diagnosis)  Plan: CBC With Differential With Platelet, Comp         Metabolic Panel (14), Lipid Panel, TSH W Reflex        To Free T4        Routne labs ordered pt vannessa had latest covid booster.     (F41.1) MARYBETH (generalized anxiety disorder)  Plan: stable on sertraline. Cpm.     (R68.3) Clubbing of nails  Plan: chronic and workup before had been negative so felt to be idiopathic.    (D75.1) Erythrocytosis  Plan: had been evaluated by hematologist before. Recheck his cbc.        No orders of the defined types were placed in this encounter.      Meds This Visit:  Requested Prescriptions      No prescriptions requested or ordered in this encounter       Imaging & Referrals:  None

## 2024-07-11 ENCOUNTER — NURSE TRIAGE (OUTPATIENT)
Dept: INTERNAL MEDICINE CLINIC | Facility: CLINIC | Age: 39
End: 2024-07-11

## 2024-07-11 NOTE — TELEPHONE ENCOUNTER
Action Requested: Summary for Provider     []  Critical Lab, Recommendations Needed  [] Need Additional Advice  []   FYI    []   Need Orders  [] Need Medications Sent to Pharmacy  []  Other     SUMMARY: Left, dominant hand going numb since started new job in April.     Reason for call: Numbness, left hand  Onset: 3 months    Reason for Disposition   MODERATE pain (e.g., interferes with normal activities) and present > 3 days    Protocols used: Hand and Wrist Pain-A-OH    Patient reports old injury to left wrist/hand years ago. Never went for treatment or evaluation.     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     First available appointment at [facility] is Monday 7/15/24 - patient accepted appointment.     Recommended being seen at [facility] if symptoms worsen prior to appointment on 7/15/24.     Patient verbalized understanding and agreement with this plan.

## 2024-07-15 ENCOUNTER — OFFICE VISIT (OUTPATIENT)
Dept: INTERNAL MEDICINE CLINIC | Facility: CLINIC | Age: 39
End: 2024-07-15

## 2024-07-15 VITALS
SYSTOLIC BLOOD PRESSURE: 132 MMHG | HEIGHT: 67 IN | DIASTOLIC BLOOD PRESSURE: 75 MMHG | WEIGHT: 193 LBS | HEART RATE: 95 BPM | BODY MASS INDEX: 30.29 KG/M2

## 2024-07-15 DIAGNOSIS — G56.02 LEFT CARPAL TUNNEL SYNDROME: Primary | ICD-10-CM

## 2024-07-15 PROCEDURE — 3008F BODY MASS INDEX DOCD: CPT | Performed by: STUDENT IN AN ORGANIZED HEALTH CARE EDUCATION/TRAINING PROGRAM

## 2024-07-15 PROCEDURE — 99215 OFFICE O/P EST HI 40 MIN: CPT | Performed by: STUDENT IN AN ORGANIZED HEALTH CARE EDUCATION/TRAINING PROGRAM

## 2024-07-15 PROCEDURE — 3078F DIAST BP <80 MM HG: CPT | Performed by: STUDENT IN AN ORGANIZED HEALTH CARE EDUCATION/TRAINING PROGRAM

## 2024-07-15 PROCEDURE — 3075F SYST BP GE 130 - 139MM HG: CPT | Performed by: STUDENT IN AN ORGANIZED HEALTH CARE EDUCATION/TRAINING PROGRAM

## 2024-07-15 RX ORDER — PREDNISONE 10 MG/1
TABLET ORAL
Qty: 18 TABLET | Refills: 0 | Status: SHIPPED | OUTPATIENT
Start: 2024-07-15 | End: 2024-07-23

## 2024-07-15 NOTE — PROGRESS NOTES
OFFICE NOTE     Patient ID: Sandro Conde is a 38 year old male.  Today's Date: 07/15/24  Chief Complaint: Numbness (B/l hand numbness mostly L hand especially with turning motions)      Pt is a 37y/o male with PMHx of MARYBETH, clubbing of nails, erythrocytosis,with prior left wrist injury (previously never had it evaluated) whom presents to clinic with left wrist/hand numbness. 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 Has numbness later in day and worse with counter clockwise rotations of wrist worsens symptoms with scalple       Vitals:    07/15/24 1446   BP: 132/75   Pulse: 95   Weight: 193 lb (87.5 kg)   Height: 5' 7\" (1.702 m)     body mass index is 30.23 kg/m².  BP Readings from Last 3 Encounters:   07/15/24 132/75   05/29/24 124/76   05/01/24 130/76     The ASCVD Risk score (Jacob DK, et al., 2019) failed to calculate for the following reasons:    The 2019 ASCVD risk score is only valid for ages 40 to 79      Medications reviewed:  Current Outpatient Medications   Medication Sig Dispense Refill    predniSONE 10 MG Oral Tab Take 3 tablets (30 mg total) by mouth daily for 3 days, THEN 2 tablets (20 mg total) daily for 3 days, THEN 1 tablet (10 mg total) daily for 3 days. TAKE WITH FOOD.. 18 tablet 0    sertraline 50 MG Oral Tab Take 1 tablet (50 mg total) by mouth daily. 90 tablet 3    triamcinolone 0.1 % External Cream Apply topically 2 (two) times daily as needed. 60 g 0    Multiple Vitamins Oral Tab Take 1 tablet by mouth daily.      Omega-3 Fatty Acids (FISH OIL OR) Take by mouth daily.           Assessment & Plan    1. Left carpal tunnel syndrome (Primary)  -     PHYSICAL THERAPY - INTERNAL  -     Occupational  Therapy Referral - [facility]  -     predniSONE; Take 3 tablets (30 mg total) by mouth daily for 3 days, THEN 2 tablets (20 mg total) daily for 3 days, THEN 1 tablet (10 mg total) daily for 3 days. TAKE WITH FOOD..  Dispense: 18 tablet; Refill: 0  -     Hand & Microvascular Surgery Referral - [facility] (Dr. Dash)  -     Cancel: ORTHOPEDIC - INTERNAL  -     Electromyogram (EMG); Future; Expected date: 07/15/2024  Pt with left arm and wrist pain from counterclockwise movements concern for left carpal tunnel syndrome  Plan;  -try conservative therapy first (thumb spica splint)  -PT/OT Eval and treat  -start prednisone taper followed by naproxen  -order EMG for nerve conduction studies  -if above is positive follow up with Hand surgeon Dr. Dash.       Follow Up: As needed/if symptoms worsen or No follow-ups on file..     I spent 42 minutes obtaining pertitent medical history, reviewing pertinent imaging/labs and specialists notes, evaluating patient, discussing differential diagnosis' and various treatment options, reinforcing importance of compliance with treatment plan, and completing documentation.     Encounter Times  PreChartin minutes    Reviewing/Obtainin minutes      Medical Exam: 4 minutes    Plan: 5 minutes      Notes: 10 minutes    Counseling/Education: 4 minutes      Referring/Communicating:   minutes    Ind Interpretation:   minutes      Care Coordination:   minutes       My total time spent caring for the patient on the day of the encounter: 42 minutes.          Objective/ Results:   Physical Exam  Constitutional:       Appearance: He is well-developed.   Cardiovascular:      Rate and Rhythm: Normal rate and regular rhythm.      Heart sounds: Normal heart sounds.   Pulmonary:      Effort: Pulmonary effort is normal.      Breath sounds: Normal breath sounds.   Abdominal:      General: Bowel sounds are normal.      Palpations: Abdomen is soft.   Musculoskeletal:      Left  wrist: Tenderness present.      Comments: Positive phalens and tinels signs of left wrist.   Skin:     General: Skin is warm and dry.   Neurological:      Mental Status: He is alert and oriented to person, place, and time.      Deep Tendon Reflexes: Reflexes are normal and symmetric.        Reviewed:    Patient Active Problem List    Diagnosis    Clubbing of fingers    Pure hypercholesterolemia    Annual physical exam    Anxiety disorder      Allergies   Allergen Reactions    Seasonal OTHER (SEE COMMENTS)     Cold symptoms        Social History     Socioeconomic History    Marital status: Single   Tobacco Use    Smoking status: Never     Passive exposure: Never    Smokeless tobacco: Never   Vaping Use    Vaping status: Never Used   Substance and Sexual Activity    Alcohol use: Yes     Alcohol/week: 0.0 standard drinks of alcohol     Comment: Beer, occasionally    Drug use: No   Other Topics Concern    Caffeine Concern No      Review of Systems   Constitutional: Negative.    Respiratory: Negative.     Cardiovascular: Negative.    Gastrointestinal: Negative.    Musculoskeletal:  Positive for arthralgias (left wrist).   Skin: Negative.    Neurological: Negative.      All other systems negative unless otherwise stated in ROS or HPI above.       Adrián Davidson MD  Internal Medicine       Call office with any questions or seek emergency care if necessary.   Patient understands and agrees to follow directions and advice.      ----------------------------------------- PATIENT INSTRUCTIONS-----------------------------------------     There are no Patient Instructions on file for this visit.      The 21st Century Cures Act makes medical notes available to patients in the interest of transparency.  However, please be advised that this is a medical document.  It is intended as a peer to peer communication.  It is written in medical language and may contain abbreviations or verbiage that are technical and unfamiliar.  It may  appear blunt or direct.  Medical documents are intended to carry relevant information, facts as evident, and the clinical opinion of the practitioner.

## 2024-08-02 ENCOUNTER — TELEPHONE (OUTPATIENT)
Dept: PHYSICAL THERAPY | Facility: HOSPITAL | Age: 39
End: 2024-08-02

## 2024-08-03 ENCOUNTER — LAB ENCOUNTER (OUTPATIENT)
Dept: LAB | Age: 39
End: 2024-08-03
Attending: INTERNAL MEDICINE
Payer: COMMERCIAL

## 2024-08-03 DIAGNOSIS — Z00.00 ANNUAL PHYSICAL EXAM: ICD-10-CM

## 2024-08-03 LAB
ALBUMIN SERPL-MCNC: 4.6 G/DL (ref 3.2–4.8)
ALBUMIN/GLOB SERPL: 1.8 {RATIO} (ref 1–2)
ALP LIVER SERPL-CCNC: 83 U/L
ALT SERPL-CCNC: 18 U/L
ANION GAP SERPL CALC-SCNC: 6 MMOL/L (ref 0–18)
AST SERPL-CCNC: 20 U/L (ref ?–34)
BASOPHILS # BLD AUTO: 0.07 X10(3) UL (ref 0–0.2)
BASOPHILS NFR BLD AUTO: 1.3 %
BILIRUB SERPL-MCNC: 0.7 MG/DL (ref 0.3–1.2)
BUN BLD-MCNC: 12 MG/DL (ref 9–23)
BUN/CREAT SERPL: 12.5 (ref 10–20)
CALCIUM BLD-MCNC: 9.4 MG/DL (ref 8.7–10.4)
CHLORIDE SERPL-SCNC: 109 MMOL/L (ref 98–112)
CHOLEST SERPL-MCNC: 211 MG/DL (ref ?–200)
CO2 SERPL-SCNC: 26 MMOL/L (ref 21–32)
CREAT BLD-MCNC: 0.96 MG/DL
DEPRECATED RDW RBC AUTO: 38.8 FL (ref 35.1–46.3)
EGFRCR SERPLBLD CKD-EPI 2021: 105 ML/MIN/1.73M2 (ref 60–?)
EOSINOPHIL # BLD AUTO: 0.21 X10(3) UL (ref 0–0.7)
EOSINOPHIL NFR BLD AUTO: 4 %
ERYTHROCYTE [DISTWIDTH] IN BLOOD BY AUTOMATED COUNT: 12.7 % (ref 11–15)
FASTING PATIENT LIPID ANSWER: YES
FASTING STATUS PATIENT QL REPORTED: YES
GLOBULIN PLAS-MCNC: 2.6 G/DL (ref 2–3.5)
GLUCOSE BLD-MCNC: 102 MG/DL (ref 70–99)
HCT VFR BLD AUTO: 47.5 %
HDLC SERPL-MCNC: 57 MG/DL (ref 40–59)
HGB BLD-MCNC: 17.1 G/DL
IMM GRANULOCYTES # BLD AUTO: 0.03 X10(3) UL (ref 0–1)
IMM GRANULOCYTES NFR BLD: 0.6 %
LDLC SERPL CALC-MCNC: 140 MG/DL (ref ?–100)
LYMPHOCYTES # BLD AUTO: 1.21 X10(3) UL (ref 1–4)
LYMPHOCYTES NFR BLD AUTO: 23.2 %
MCH RBC QN AUTO: 30.4 PG (ref 26–34)
MCHC RBC AUTO-ENTMCNC: 36 G/DL (ref 31–37)
MCV RBC AUTO: 84.5 FL
MONOCYTES # BLD AUTO: 0.34 X10(3) UL (ref 0.1–1)
MONOCYTES NFR BLD AUTO: 6.5 %
NEUTROPHILS # BLD AUTO: 3.36 X10 (3) UL (ref 1.5–7.7)
NEUTROPHILS # BLD AUTO: 3.36 X10(3) UL (ref 1.5–7.7)
NEUTROPHILS NFR BLD AUTO: 64.4 %
NONHDLC SERPL-MCNC: 154 MG/DL (ref ?–130)
OSMOLALITY SERPL CALC.SUM OF ELEC: 292 MOSM/KG (ref 275–295)
PLATELET # BLD AUTO: 218 10(3)UL (ref 150–450)
POTASSIUM SERPL-SCNC: 3.9 MMOL/L (ref 3.5–5.1)
PROT SERPL-MCNC: 7.2 G/DL (ref 5.7–8.2)
RBC # BLD AUTO: 5.62 X10(6)UL
SODIUM SERPL-SCNC: 141 MMOL/L (ref 136–145)
TRIGL SERPL-MCNC: 76 MG/DL (ref 30–149)
TSI SER-ACNC: 0.67 MIU/ML (ref 0.55–4.78)
VLDLC SERPL CALC-MCNC: 14 MG/DL (ref 0–30)
WBC # BLD AUTO: 5.2 X10(3) UL (ref 4–11)

## 2024-08-03 PROCEDURE — 84443 ASSAY THYROID STIM HORMONE: CPT

## 2024-08-03 PROCEDURE — 80061 LIPID PANEL: CPT

## 2024-08-03 PROCEDURE — 80053 COMPREHEN METABOLIC PANEL: CPT

## 2024-08-03 PROCEDURE — 85025 COMPLETE CBC W/AUTO DIFF WBC: CPT

## 2024-08-03 PROCEDURE — 36415 COLL VENOUS BLD VENIPUNCTURE: CPT

## 2024-08-07 ENCOUNTER — OFFICE VISIT (OUTPATIENT)
Dept: PHYSICAL THERAPY | Age: 39
End: 2024-08-07
Attending: STUDENT IN AN ORGANIZED HEALTH CARE EDUCATION/TRAINING PROGRAM
Payer: COMMERCIAL

## 2024-08-07 DIAGNOSIS — G56.02 LEFT CARPAL TUNNEL SYNDROME: Primary | ICD-10-CM

## 2024-08-07 PROCEDURE — 97165 OT EVAL LOW COMPLEX 30 MIN: CPT

## 2024-08-07 PROCEDURE — 97110 THERAPEUTIC EXERCISES: CPT

## 2024-08-07 NOTE — PROGRESS NOTES
OCCUPATIONAL THERAPY UPPER EXTREMITY EVALUATION     Diagnosis:    Left carpal tunnel syndrome      Referring Provider:  SULLY PHAM Date of Evaluation:    8/7/2024    Precautions:  None Next MD visit:   none scheduled  Date of Surgery: n/a   Order Date:  7/15/2024  Authorizing Provider:   SULLY PHAM     Procedure:  OCCUPATIONAL THERAPY - INTERNAL [[id number]]         Order #:   [id number]  Qty:  1     Priority:  Routine                   Class:   IHP - RFL     Standing Interval:          Standing Occurrences:          Expires on:            Expected by:    Associated DX:  Left carpal tunnel syndrome (G56.02)     Order summary:  IHP - RFL, Routine, 10 visits  Occupational  Therapy Area of Concentration: Orthopedic  Occupational Therapy Ortho: UE  If the patient can be seen sooner with a PT vs an OT, can we cancel this order and replace with a PT order? Yes         Comments:        PATIENT SUMMARY   Pt is a 38 year old male who presents to therapy today with complaints of numbness and decreased strength.   Pt has had numbness for a few years. Pt was having tendonitis and some pain in hand while working at [employer] 4 years ago. 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  Has numbness later in day and worse with counter clockwise rotations of wrist when using scalpel.  Pt was given prednisone which alleviated numbness but continues to have weakness. Pt scheduled to have EMG next week.   Pt describes pain level current 0/10, at best 0/10, at worst 2/10.   Current functional limitations include gripping.  Pt describes prior level of function independent.   Employment: Working full duty  Hand Dominance: left  Living Situation: Alone  Imaging/Tests: none  Pt goals include regain strength and reduce numbness.  Past medical history was reviewed with pt. Significant findings include none    ASSESSMENT  Pt  presents to occupational therapy evaluation with primary c/o decreased strength and numbness. The results of  the objective tests and measures show decreased strength and increased subjective c/o pain.  Functional deficits include but are not limited to gripping.  Signs and symptoms are consistent with diagnosis of (L) CTS. Pt and OT discussed evaluation findings, pathology, POC and HEP.  Pt voiced understanding and performs HEP correctly without reported pain. Skilled Occupational Therapy is medically necessary to address the above impairments and reach functional goals.     OBJECTIVE    OBSERVATION: Unremarkable     ORTHOTICS: none      SENSORY: 2.83 (normal sensation) [place] Herbie Sensory test      CIRCUMFERENTIAL EDEMA (cm):  Right Wrist crease: 17.1  Left Wrist crease: 16.8    ROM: (* denotes performed with pain) WNL      Strength (lbs) Right Average Left Average   : 85 80   2 pt Pinch: 12 14   3 pt Pinch: 14 14   Lateral Pinch: 18 21       Today’s Treatment and Response:   Treatment provided today in addition to the initial evaluation:   Date 8/7/2024         Visit # 1/6         Evaluation Initial  X         Manual                                             Ther ex                                                                                                         HEP issued See table below         Therapeutic Activity                                                          Neuromuscular Re-education                                         X= performed this date as previously outlined    Pt education was provided on exam findings, treatment diagnosis, treatment plan, expectations, and prognosis. Pt was also provided recommendations for body mechanics, splinting. Patient was instructed in and issued a HEP.      HEP Date issued  Date amended Date discharged  Comments (HEP2Go code if used)   Acute CTS [place] protocol 8/7/24      Wrist stretches 8/7/24                                             Charges: OT Eval: Low Complexity, 1TE      Total Timed Treatment: 45 min     Total Treatment Time: 45 min      Based on clinical rationale and outcome measures, this evaluation involved Low Complexity decision making due to 1-2 personal factors/comorbidities, body structures involved/activity limitations, and unstable symptoms including changing pain levels.  PLAN OF CARE   Goals: (to be met in 6 visits)  Increase (L)  by 4-5 lbs to allow pt to  tools at work.   Increase (L) 3 pt pinch by 2-3 lbs to allow pt to use scalpel.   Pt to verbalize 2/3 proper  principles with 100% accuracy.   Pt will be independent and compliant with comprehensive HEP to maintain progress achieved in OT    Frequency / Duration: Patient will be seen for 1-2 x/week or a total of 6 visits over a 90 day period.  Treatment will include: Manual Therapy, Neuromuscular Re-education, Self-Care Home Management, Therapeutic Activities, Therapeutic Exercise, Home Exercise Program instruction, and Modalities to include: Ultrasound    Education or treatment limitation: None  Rehab Potential:good    Patient/Family/Caregiver was advised of these findings, precautions, and treatment options and has agreed to actively participate in planning and for this course of care.    Thank you for your referral. Please co-sign or sign and return this letter via fax as soon as possible to [phone]. If you have any questions, please contact me at Dept: [phone]    Sincerely,  Electronically signed by therapist: NERIKE Malin/L  Physician's certification required: Yes  I certify the need for these services furnished under this plan of treatment and while under my care.    X___________________________________________________ Date____________________    Certification From: 8/07/2024  To:10/26/2024

## 2024-08-12 ENCOUNTER — APPOINTMENT (OUTPATIENT)
Dept: PHYSICAL THERAPY | Age: 39
End: 2024-08-12
Attending: STUDENT IN AN ORGANIZED HEALTH CARE EDUCATION/TRAINING PROGRAM
Payer: COMMERCIAL

## 2024-08-12 ENCOUNTER — TELEPHONE (OUTPATIENT)
Dept: PHYSICAL THERAPY | Facility: HOSPITAL | Age: 39
End: 2024-08-12

## 2024-08-15 ENCOUNTER — PROCEDURE VISIT (OUTPATIENT)
Dept: PHYSICAL MEDICINE AND REHAB | Facility: CLINIC | Age: 39
End: 2024-08-15
Payer: COMMERCIAL

## 2024-08-15 DIAGNOSIS — G56.02 LEFT CARPAL TUNNEL SYNDROME: ICD-10-CM

## 2024-08-15 NOTE — PROGRESS NOTES
[facility]  Electromyography Consultation      History of Present Illness:    Dear Dr. Davidson,   Thank you for the opportunity to see Sandro Conde for electrodiagnostic consultation today. As you know the patient is a 38 year old male with a chief complaint of several years of left hand numbness and tingling.  He is left-handed.  No injury associated.  He used to work at the [employer].  Numbness and tingling comes and goes, no nocturnal dysesthesias.  Denies weakness.    PAST MEDICAL HISTORY:  Past Medical History:    Allergic rhinitis    Anxiety state, unspecified    Clubbing of digits    Seasonal allergies       SURGICAL HISTORY:  No past surgical history on file.    SOCIAL HISTORY:   Social History     Occupational History    Not on file   Tobacco Use    Smoking status: Never     Passive exposure: Never    Smokeless tobacco: Never   Vaping Use    Vaping status: Never Used   Substance and Sexual Activity    Alcohol use: Yes     Alcohol/week: 0.0 standard drinks of alcohol     Comment: Beer, occasionally    Drug use: No    Sexual activity: Not on file       FAMILY HISTORY:   Family History   Problem Relation Age of Onset    Lipids Father     Hypertension Mother     Cancer Mother     No Known Problems Sister        CURRENT MEDICATIONS:   Current Outpatient Medications   Medication Sig Dispense Refill    sertraline 50 MG Oral Tab Take 1 tablet (50 mg total) by mouth daily. 90 tablet 3    triamcinolone 0.1 % External Cream Apply topically 2 (two) times daily as needed. 60 g 0    Multiple Vitamins Oral Tab Take 1 tablet by mouth daily.      Omega-3 Fatty Acids (FISH OIL OR) Take by mouth daily.         PHYSICAL EXAM:   There were no vitals taken for this visit.    There is no height or weight on file to calculate BMI.      General: No immediate distress   Extremities: peripheral pulses intact, no lower extremity edema bilaterally   Skin: No lesions noted.    Neuro:   Strength: Upper extremities have 5/5 strength, except trace left APB weakness  Muscle bulk: No atrophy  Sensation: Normal upper extremities  Reflexes: Normal upper extremities  Tinel's sign: Negative over the wrist bilaterally    EMG/NCV  Sensory NCS      Nerve / Sites Distance Segments Peak Lat NP Amp    cm  ms µV   L MEDIAN - Dig III Antidr      Wrist 14 Wrist - Dig III 5.60 14.3      Ref.  Ref. 3.80 20.0      Palm 7 Palm - Dig III 2.60 9.9   L ULNAR - Dig V Antidr      Wrist 14 Wrist - Dig V 3.30 20.5      Ref.  Ref. 3.80 10.0       Motor NCS      Nerve / Sites Distance Segments Latency Amplitude Velocity Amp.1-2 Peak Dur. Area    cm  ms mV m/s % ms mVms   L MEDIAN - APB      Wrist 8 Wrist - APB 6.35 9.9  100 6.60 38.8      Ref.  Ref. 4.40 4.0          Elbow 23 Elbow - Wrist 10.80 9.9 51.7 99.6 6.80 37.8      Ref.  Ref.   49.0      L ULNAR - ADM      Wrist 8 Wrist - ADM 2.80 11.2  100 6.00 34.4      Ref.  Ref. 4.20 5.0          B.Elbow 24 B.Elbow - Wrist 7.05 10.4 56.5 92.9 6.35 31.7      Ref.  Ref.   50.0          EMG Summary Table     Spontaneous MUAP Recruitment    IA Fib PSW Fasc H.F. Amp Dur. PPP Pattern   L. TRICEPS N None None None None N N N N   L. BICEPS N None None None None N N N N   L. FLEX CARPI RAD N None None None None N N N N   L. FIRST D INTEROSS N None None None None N N N N   L. ABD POLL BREVIS N None None None None N N N N       Findings: Extremities were warmed with hot packs for 15 minutes prior to testing.  Sensory nerve conduction studies revealed prolonged median latency with small amplitude.  There is conduction delay across the transverse carpal ligament.  The left ulnar study was normal.  Motor nerve conduction studies revealed a prolonged medial distal latency with normal amplitudes and conduction velocity.  The left ulnar motor response was normal.  Needle EMG of the left upper extremity was normal in all muscles tested.  Pression:  1.  Abnormal study.  2.   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  This is characterized by sensory and motor demyelination.  There is also mild sensory axon loss.  3.  No electrodiagnostic evidence of left cervical radiculopathy.      ASSESSMENT AND PLAN:  1. Left carpal tunnel syndrome  The patient has clinical and electrodiagnostic evidence consistent with left carpal tunnel syndrome.  I recommended a night splint, he is set up for occupational therapy which I think is an excellent recommendation.  - EMG (At [facility])        Thank you for the opportunity to participate in the care of this patient.  Sincerely,    Harry Paniagua M.D.  Diplomate American Board of Physical Medicine and Rehabilitation

## 2024-08-16 ENCOUNTER — OFFICE VISIT (OUTPATIENT)
Dept: PHYSICAL THERAPY | Age: 39
End: 2024-08-16
Attending: STUDENT IN AN ORGANIZED HEALTH CARE EDUCATION/TRAINING PROGRAM
Payer: COMMERCIAL

## 2024-08-16 PROCEDURE — 97140 MANUAL THERAPY 1/> REGIONS: CPT

## 2024-08-16 PROCEDURE — 97110 THERAPEUTIC EXERCISES: CPT

## 2024-08-16 PROCEDURE — 97035 APP MDLTY 1+ULTRASOUND EA 15: CPT

## 2024-08-16 NOTE — PROGRESS NOTES
Diagnosis:    Left carpal tunnel syndrome      Referring Provider:  SULLY PHAM Date of Evaluation:    8/7/2024    Precautions:  None Next MD visit:   none scheduled  Date of Surgery: n/a   Order Date:  7/15/2024  Authorizing Provider:   SULLY PHAM     Procedure:  OCCUPATIONAL THERAPY - INTERNAL [[id number]]         Order #:   [id number]  Qty:  1     Priority:  Routine                   Class:   IHP - RFL     Standing Interval:          Standing Occurrences:          Expires on:            Expected by:    Associated DX:  Left carpal tunnel syndrome (G56.02)     Order summary:  IHP - RFL, Routine, 10 visits  Occupational  Therapy Area of Concentration: Orthopedic  Occupational Therapy Ortho: UE  If the patient can be seen sooner with a PT vs an OT, can we cancel this order and replace with a PT order? Yes         Comments:      SUBJECTIVE: Pt states that he had the EMG which indicated moderate CTS.  PAIN LEVELS: 0/10      OBJECTIVE: See tx log for details.      ORTHOTICS: none      SENSORY: 2.83 (normal sensation) [place] Herbie Sensory test      CIRCUMFERENTIAL EDEMA (cm):  Right Wrist crease: 17.1  Left Wrist crease: 16.8    ROM: (* denotes performed with pain) WNL      Strength (lbs) Right Average Left Average   : 85 80   2 pt Pinch: 12 14   3 pt Pinch: 14 14   Lateral Pinch: 18 21         ASSESSMENT  Reviewed HEP with pt. Pt performing as instructed. Pt had EMG which indicated moderate CTS. Issued median nerve glides. Pt demonstrated good understanding of nerve glides. Reviewed importance of monitoring body mechanics and sleep positions. Also instructed pt to wear splint at night and during day if he is performing activities which contribute to sensory changes. Pt agreed.       Today’s Treatment and Response:   Treatment provided today in addition to the initial evaluation:   Date 8/7/2024 8/16/24       Visit # 1/6 2/6       Evaluation Initial  X         Manual           STM   x                                Ther ex            AROM with fluidotherapy x 10\"   x        powerweb     stretches        flexbar perturbations    with wrist in neutral x 2 min                                                                  HEP issued See table below  x       Therapeutic Activity                                                          Neuromuscular Re-education                           Modalities       U.S. 3.0Mhz, 1.0w/cm^2 x 8\", pulsed to CTS  #1                   X= performed this date as previously outlined    Pt education was provided on exam findings, treatment diagnosis, treatment plan, expectations, and prognosis. Pt was also provided recommendations for body mechanics, splinting. Patient was instructed in and issued a HEP.      HEP Date issued  Date amended Date discharged  Comments (HEP2Go code if used)   Acute CTS [place] protocol 8/7/24      Wrist stretches 8/7/24      Median nerve glides 8/16/24                                          Goals: (to be met in 6 visits)  Increase (L)  by 4-5 lbs to allow pt to  tools at work.   Increase (L) 3 pt pinch by 2-3 lbs to allow pt to use scalpel.   Pt to verbalize 2/3 proper  principles with 100% accuracy.   Pt will be independent and compliant with comprehensive HEP to maintain progress achieved in OT    PLAN: Patient will be seen for 1-2 x/week or a total of 6 visits over a 90 day period.  Treatment will include: Manual Therapy, Neuromuscular Re-education, Self-Care Home Management, Therapeutic Activities, Therapeutic Exercise, Home Exercise Program instruction, and Modalities to include: Ultrasound        Charges: 1MT, 1TE, 1US       Total Timed Treatment: 45 min     Total Treatment Time: 45 min       Estefani RodriguezOTR/L

## 2024-08-27 ENCOUNTER — OFFICE VISIT (OUTPATIENT)
Dept: PHYSICAL THERAPY | Age: 39
End: 2024-08-27
Attending: STUDENT IN AN ORGANIZED HEALTH CARE EDUCATION/TRAINING PROGRAM
Payer: COMMERCIAL

## 2024-08-27 PROCEDURE — 97110 THERAPEUTIC EXERCISES: CPT

## 2024-08-27 PROCEDURE — 97035 APP MDLTY 1+ULTRASOUND EA 15: CPT

## 2024-08-27 NOTE — PROGRESS NOTES
Diagnosis:    Left carpal tunnel syndrome      Referring Provider:  SULLY PHAM Date of Evaluation:    8/7/2024    Precautions:  None Next MD visit:   none scheduled  Date of Surgery: n/a   Order Date:  7/15/2024  Authorizing Provider:   SULLY PHAM     Procedure:  OCCUPATIONAL THERAPY - INTERNAL [[id number]]         Order #:   [id number]  Qty:  1     Priority:  Routine                   Class:   IHP - RFL     Standing Interval:          Standing Occurrences:          Expires on:            Expected by:    Associated DX:  Left carpal tunnel syndrome (G56.02)     Order summary:  IHP - RFL, Routine, 10 visits  Occupational  Therapy Area of Concentration: Orthopedic  Occupational Therapy Ortho: UE  If the patient can be seen sooner with a PT vs an OT, can we cancel this order and replace with a PT order? Yes         Comments:      SUBJECTIVE: Pt states that he only gets an occasional twinge.   PAIN LEVELS: 0/10      OBJECTIVE: See tx log for details.      ORTHOTICS: none      SENSORY: 2.83 (normal sensation) [place] Herbie Sensory test      CIRCUMFERENTIAL EDEMA (cm):  Right Wrist crease: 17.1  Left Wrist crease: 16.8    ROM: (* denotes performed with pain) WNL      Strength (lbs) Right Average Left Average   : 85 80   2 pt Pinch: 12 14   3 pt Pinch: 14 14   Lateral Pinch: 18 21         ASSESSMENT  Decreased symptoms reported as pt is performing HEP, wearing splints and adhering to proper body mechanics. Good tolerance to session without any increase in symptoms.        Today’s Treatment and Response:   Treatment provided today in addition to the initial evaluation:   Date 8/7/2024 8/16/24 8/27/24     Visit # 1/6  2/6  3/6     Evaluation Initial  X         Manual           STM   x                               Ther ex            AROM with fluidotherapy x 10\"   x  x      powerweb     stretches  x      flexbar perturbations    with wrist in neutral x 2 min  x      frisbee perturbations      CW/CCW x 2 min in  each direction      sponges       abd/add                                        HEP issued See table below  x       Therapeutic Activity                                                          Neuromuscular Re-education                           Modalities       U.S. 3.0Mhz, 1.0w/cm^2 x 8\", pulsed to CTS  #1 #2                  X= performed this date as previously outlined    Pt education was provided on exam findings, treatment diagnosis, treatment plan, expectations, and prognosis. Pt was also provided recommendations for body mechanics, splinting. Patient was instructed in and issued a HEP.      HEP Date issued  Date amended Date discharged  Comments (HEP2Go code if used)   Acute CTS [place] protocol 8/7/24      Wrist stretches 8/7/24      Median nerve glides 8/16/24                                          Goals: (to be met in 6 visits)  Increase (L)  by 4-5 lbs to allow pt to  tools at work.   Increase (L) 3 pt pinch by 2-3 lbs to allow pt to use scalpel.   Pt to verbalize 2/3 proper  principles with 100% accuracy.   Pt will be independent and compliant with comprehensive HEP to maintain progress achieved in OT    PLAN: Patient will be seen for 1-2 x/week or a total of 6 visits over a 90 day period.  Treatment will include: Manual Therapy, Neuromuscular Re-education, Self-Care Home Management, Therapeutic Activities, Therapeutic Exercise, Home Exercise Program instruction, and Modalities to include: Ultrasound        Charges: 1MT, 2 TE       Total Timed Treatment: 45 min     Total Treatment Time: 45 min       Estefani RodriguezOTR/L

## 2024-08-31 ENCOUNTER — HOSPITAL ENCOUNTER (OUTPATIENT)
Age: 39
Discharge: HOME | End: 2024-08-31
Payer: COMMERCIAL

## 2024-08-31 VITALS
TEMPERATURE: 97 F | SYSTOLIC BLOOD PRESSURE: 149 MMHG | DIASTOLIC BLOOD PRESSURE: 78 MMHG | OXYGEN SATURATION: 98 % | HEART RATE: 100 BPM | RESPIRATION RATE: 18 BRPM

## 2024-08-31 DIAGNOSIS — S61.011A THUMB LACERATION, RIGHT, INITIAL ENCOUNTER: Primary | ICD-10-CM

## 2024-08-31 RX ORDER — LIDOCAINE HYDROCHLORIDE 10 MG/ML
5 INJECTION, SOLUTION EPIDURAL; INFILTRATION; INTRACAUDAL; PERINEURAL ONCE
Status: COMPLETED | OUTPATIENT
Start: 2024-08-31 | End: 2024-08-31

## 2024-08-31 NOTE — ED INITIAL ASSESSMENT (HPI)
Patient presents with a laceration to right thumb, states he cut finger while at the gym this am. LATOYA UMANZOR

## 2024-08-31 NOTE — ED PROVIDER NOTES
Patient Seen in: [facility]      History     Chief Complaint   Patient presents with    Laceration/Abrasion     Stated Complaint: Finger Injury  Subjective:   38-year-old male with eczema and anxiety presents from home.  Patient is here with a right thumb laceration.  States he cut his hand on a piece of metal while at the gym about 10am.  Denies pain.  Denies numbness or tingling.  He is left-hand dominant.  Tetanus is up-to-date    The history is provided by the patient. No  was used.     Objective:   Past Medical History:    Allergic rhinitis    Anxiety state, unspecified    Clubbing of digits    Seasonal allergies            History reviewed. No pertinent surgical history.           No pertinent social history.          Review of Systems    Positive for stated complaint: Laceration/Abrasion    Other systems are as noted in HPI.  Constitutional and vital signs reviewed.      All other systems reviewed and negative except as noted above.    Physical Exam     ED Triage Vitals [08/31/24 1359]   /78   Pulse 100   Resp 18   Temp 97 °F (36.1 °C)   Temp src Temporal   SpO2 98 %   O2 Device None (Room air)     Current:/78   Pulse 100   Temp 97 °F (36.1 °C) (Temporal)   Resp 18   SpO2 98%     Physical Exam  Vitals and nursing note reviewed.   Constitutional:       General: He is not in acute distress.     Appearance: Normal appearance. He is not ill-appearing or toxic-appearing.   HENT:      Head: Normocephalic and atraumatic.      Nose: Nose normal.      Mouth/Throat:      Mouth: Mucous membranes are moist.      Pharynx: Oropharynx is clear.   Eyes:      Pupils: Pupils are equal, round, and reactive to light.   Cardiovascular:      Rate and Rhythm: Normal rate and regular rhythm.      Pulses: Normal pulses.   Pulmonary:      Effort: Pulmonary effort is normal. No respiratory distress.      Breath sounds: Normal breath sounds.      Comments: Lungs clear.  No adventitious  lung sounds.  No distress.  No hypoxia.  Pulse ox 98% ra. Which is normal    Abdominal:      General: Abdomen is flat.      Palpations: Abdomen is soft.   Musculoskeletal:         General: No signs of injury. Normal range of motion.      Cervical back: Normal range of motion and neck supple.      Comments: Chronic clubbing of the fingertips   Skin:     General: Skin is warm and dry.      Capillary Refill: Capillary refill takes less than 2 seconds.      Comments: 2.25 cm linear laceration to the palmar base of the right thumb, extending to the webbing.  Bleeding controlled.  Nontender.  Push/pull intact.  CMS intact.   Neurological:      General: No focal deficit present.      Mental Status: He is alert and oriented to person, place, and time.      GCS: GCS eye subscore is 4. GCS verbal subscore is 5. GCS motor subscore is 6.   Psychiatric:         Mood and Affect: Mood normal.         Behavior: Behavior normal.         Thought Content: Thought content normal.         Judgment: Judgment normal.         ED Course   Radiology:  No results found.  Labs Reviewed - No data to display  Procedure Name: Laceration Repair  Indication: Reduce risk of infection  Location: right thumb  Pre-Procedure Diagnosis: Laceration  Post-Procedure Diagnosis: Repaired Laceration  Informed consent was obtained before procedure started.  PROCEDURE:  The appropriate timeout was taken. The area was prepped and draped in the usual sterile fashion. Local anesthesia was achieved using 7cc of  Lidocaine 1% without epinephrine digital block. The wound was copiously irrigated. 5 5-0 ethilon interrupted sutures were placed.    Estimated blood loss was less than 0.5 mL. A dressing was applied to the area and anticipatory guidance, as well as standard post-procedure care, was explained. Return precautions are given. The patient tolerated the procedure well without complications.  Follow-up visit set for suture removal and evaluation of the  laceration    MDM     Medical Decision Making  Differential diagnoses reflecting the complexity of care include: Hand laceration, open fracture, foreign body  Patient with laceration to the palmar aspect of the right thumb.  No deep puncture wound.  No tenderness.  No pain with movement.  No evidence of foreign body.  No suspicion for open fracture.  No indication for x-ray  Push/Pull intact.  CMS intact.  No evidence of tendon or nerve injury  Wound closure with 5 sutures  Tetanus is up-to-date  Bacitracin, oil emulsion dressing, and kerlix applied    Plan of Care: Clean with soap and water.  Apply topical antibiotic ointment.  Keep clean and dry.  Suture removal in 10 days    Results and plan of care discussed with the patient/family. They are in agreement with discharge. They understand to follow up with their primary doctor or the referral physician for further evaluation, especially if no improvement.  Also discussed the limitations of immediate care, patient is aware that if symptoms are worse they should go to the emergency room. Verbal and written discharge instructions were given.     My independent interpretation of studies of: N/A  Diagnostic tests and medications considered but not ordered were: X-ray  Shared decision making was done by: Patient agreeable to sutures.  Comorbidities that add complexity to management include: Anxiety and eczema   External chart review was done and was noted: N/A  History obtained by an independent source was from: N/A   Discussions and management was done with: N/A  Social determinants of health that affect care: N/A              Problems Addressed:  Thumb laceration, right, initial encounter: acute illness or injury    Risk  OTC drugs.        Disposition and Plan     Clinical Impression:  1. Thumb laceration, right, initial encounter         Disposition:  Discharge  8/31/2024  2:26 pm    Follow-up:  Raymond Miller MD  [address] [place] [address]  [id number]  [phone]    Schedule an appointment as soon as possible for a visit in 10 days            Medications Prescribed:  Discharge Medication List as of 8/31/2024  2:26 PM

## 2024-08-31 NOTE — DISCHARGE INSTRUCTIONS
Clean the wound with soap and water.  Apply topical antibiotic ointment twice a day.  Take Tylenol or Motrin as needed for pain.  Change the dressing as needed.  Keep the wound clean and dry.  Sutures should come out in about 10 days, call your primary doctor to schedule this appointment

## 2024-09-03 ENCOUNTER — APPOINTMENT (OUTPATIENT)
Dept: PHYSICAL THERAPY | Age: 39
End: 2024-09-03
Attending: STUDENT IN AN ORGANIZED HEALTH CARE EDUCATION/TRAINING PROGRAM
Payer: COMMERCIAL

## 2024-09-04 ENCOUNTER — OFFICE VISIT (OUTPATIENT)
Dept: PHYSICAL THERAPY | Age: 39
End: 2024-09-04
Attending: STUDENT IN AN ORGANIZED HEALTH CARE EDUCATION/TRAINING PROGRAM
Payer: COMMERCIAL

## 2024-09-04 PROCEDURE — 97110 THERAPEUTIC EXERCISES: CPT

## 2024-09-04 NOTE — PROGRESS NOTES
Diagnosis:    Left carpal tunnel syndrome      Referring Provider:  SULLY PHAM Date of Evaluation:    8/7/2024    Precautions:  None Next MD visit:   none scheduled  Date of Surgery: n/a   Order Date:  7/15/2024  Authorizing Provider:   SULLY PHAM     Procedure:  OCCUPATIONAL THERAPY - INTERNAL [[id number]]         Order #:   [id number]  Qty:  1     Priority:  Routine                   Class:   IHP - RFL     Standing Interval:          Standing Occurrences:          Expires on:            Expected by:    Associated DX:  Left carpal tunnel syndrome (G56.02)     Order summary:  IHP - RFL, Routine, 10 visits  Occupational  Therapy Area of Concentration: Orthopedic  Occupational Therapy Ortho: UE  If the patient can be seen sooner with a PT vs an OT, can we cancel this order and replace with a PT order? Yes         Comments:      SUBJECTIVE: Pt states that he only has symptoms if he doesn't follow proper body mechanics.    PAIN LEVELS: 0/10      OBJECTIVE: See tx log for details.      ORTHOTICS: none      SENSORY: 2.83 (normal sensation) [place] Herbie Sensory test      CIRCUMFERENTIAL EDEMA (cm):  Right Wrist crease: 17.1  Left Wrist crease: 16.8    ROM: (* denotes performed with pain) WNL      Strength (lbs) Right Average Left Average   : 85 80   2 pt Pinch: 12 14   3 pt Pinch: 14 14   Lateral Pinch: 18 21         ASSESSMENT  Weakness in hand reported after completion of strengthening exercises. Occurrence of symptoms only reported when pt is noncompliant with proper body mechanics. Pt advised to continue with HEP and adhering to following proper body mechanics.        Today’s Treatment and Response:   Treatment provided today in addition to the initial evaluation:   Date 8/7/2024 8/16/24 8/27/24 9/4/24   Visit # 1/6  2/6  3/6  4/6   Evaluation Initial  X         Manual           STM   x                               Ther ex            AROM with fluidotherapy x 10\"   x  x      powerweb     stretches  x       flexbar perturbations    with wrist in neutral x 2 min  x  green     frisbee perturbations      CW/CCW x 2 min in each direction  x    sponges       abd/add      digiflex        red x 3 min    handmaster plus        x 3 min              HEP issued See table below  x       Therapeutic Activity                                                          Neuromuscular Re-education                           Modalities       U.S. 3.0Mhz, 1.0w/cm^2 x 8\", pulsed to CTS  #1 #2                  X= performed this date as previously outlined    Pt education was provided on exam findings, treatment diagnosis, treatment plan, expectations, and prognosis. Pt was also provided recommendations for body mechanics, splinting. Patient was instructed in and issued a HEP.      HEP Date issued  Date amended Date discharged  Comments (HEP2Go code if used)   Acute CTS [place] protocol 8/7/24      Wrist stretches 8/7/24      Median nerve glides 8/16/24                                          Goals: (to be met in 6 visits)  Increase (L)  by 4-5 lbs to allow pt to  tools at work.   Increase (L) 3 pt pinch by 2-3 lbs to allow pt to use scalpel.   Pt to verbalize 2/3 proper  principles with 100% accuracy.   Pt will be independent and compliant with comprehensive HEP to maintain progress achieved in OT    PLAN: Patient will be seen for 1-2 x/week or a total of 6 visits over a 90 day period.  Treatment will include: Manual Therapy, Neuromuscular Re-education, Self-Care Home Management, Therapeutic Activities, Therapeutic Exercise, Home Exercise Program instruction, and Modalities to include: Ultrasound        Charges: 3 TE       Total Timed Treatment: 45 min     Total Treatment Time: 45 min       Estefani RodriguezOTR/L

## 2024-09-05 ENCOUNTER — OFFICE VISIT (OUTPATIENT)
Dept: INTERNAL MEDICINE CLINIC | Facility: CLINIC | Age: 39
End: 2024-09-05

## 2024-09-05 VITALS
HEIGHT: 67 IN | OXYGEN SATURATION: 97 % | WEIGHT: 190 LBS | DIASTOLIC BLOOD PRESSURE: 79 MMHG | HEART RATE: 89 BPM | SYSTOLIC BLOOD PRESSURE: 132 MMHG | BODY MASS INDEX: 29.82 KG/M2

## 2024-09-05 DIAGNOSIS — S61.011D: Primary | ICD-10-CM

## 2024-09-05 PROCEDURE — 3075F SYST BP GE 130 - 139MM HG: CPT

## 2024-09-05 PROCEDURE — 3008F BODY MASS INDEX DOCD: CPT

## 2024-09-05 PROCEDURE — 3078F DIAST BP <80 MM HG: CPT

## 2024-09-05 PROCEDURE — 99213 OFFICE O/P EST LOW 20 MIN: CPT

## 2024-09-05 NOTE — PROGRESS NOTES
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    Current Outpatient Medications   Medication Sig Dispense Refill    sertraline 50 MG Oral Tab Take 1 tablet (50 mg total) by mouth daily. 90 tablet 3    triamcinolone 0.1 % External Cream Apply topically 2 (two) times daily as needed. 60 g 0    Multiple Vitamins Oral Tab Take 1 tablet by mouth daily.      Omega-3 Fatty Acids (FISH OIL OR) Take by mouth daily.       Review of Systems:  Review of Systems  10 point review of systems otherwise negative with the exception of HPI and assessment and plan    Objective:   /79   Pulse 89   Ht 5' 7\" (1.702 m)   Wt 190 lb (86.2 kg)   SpO2 97%   BMI 29.76 kg/m²  Estimated body mass index is 29.76 kg/m² as calculated from the following:    Height as of this encounter: 5' 7\" (1.702 m).    Weight as of this encounter: 190 lb (86.2 kg).  Physical Exam  Vitals reviewed.   Constitutional:       General: He is not in acute distress.     Appearance: Normal appearance. He is well-developed.   Cardiovascular:      Rate and Rhythm: Normal rate.   Pulmonary:      Effort: Pulmonary effort is normal.   Skin:     General: Skin is warm and dry.      Comments: 5 intact interrupted sutures to the base of the right thumb   Wound C/D/I well approximated  Neurovascularly intact   Neurological:      Mental  Status: He is alert and oriented to person, place, and time.       Assessment & Plan:   1. Laceration of skin of right thumb, subsequent encounter (Primary)  Not due for suture removal for 2 more days; can see a provider on Saturday here or return to    Work note provided    PARESH Tucker, 9/5/2024, 9:32 AM

## 2024-09-09 ENCOUNTER — OFFICE VISIT (OUTPATIENT)
Dept: INTERNAL MEDICINE CLINIC | Facility: CLINIC | Age: 39
End: 2024-09-09

## 2024-09-09 ENCOUNTER — APPOINTMENT (OUTPATIENT)
Dept: PHYSICAL THERAPY | Age: 39
End: 2024-09-09
Attending: STUDENT IN AN ORGANIZED HEALTH CARE EDUCATION/TRAINING PROGRAM
Payer: COMMERCIAL

## 2024-09-09 VITALS
OXYGEN SATURATION: 98 % | HEIGHT: 67 IN | DIASTOLIC BLOOD PRESSURE: 92 MMHG | WEIGHT: 191 LBS | HEART RATE: 82 BPM | SYSTOLIC BLOOD PRESSURE: 134 MMHG | BODY MASS INDEX: 29.98 KG/M2

## 2024-09-09 DIAGNOSIS — R03.0 ELEVATED BLOOD PRESSURE READING WITHOUT DIAGNOSIS OF HYPERTENSION: ICD-10-CM

## 2024-09-09 DIAGNOSIS — Z48.02 ENCOUNTER FOR REMOVAL OF SUTURES: Primary | ICD-10-CM

## 2024-09-09 PROCEDURE — 3080F DIAST BP >= 90 MM HG: CPT

## 2024-09-09 PROCEDURE — 3075F SYST BP GE 130 - 139MM HG: CPT

## 2024-09-09 PROCEDURE — 99213 OFFICE O/P EST LOW 20 MIN: CPT

## 2024-09-09 PROCEDURE — 3008F BODY MASS INDEX DOCD: CPT

## 2024-09-09 NOTE — PROGRESS NOTES
Subjective:   Sandro Conde is a 38 year old male who presents for No chief complaint on file.     Patient presents for suture removal. He has 5 sutures to the right thumb placed on 8/31. Denies drainage from the wound, no pain, no fevers, neurovascularly intact.     BP slightly elevated - improved on recheck but still slightly elevated  He has some baseline anxiety  States diet could improve - reduce salt, add more fruits/vegetables, less take-out food. He lifts weights but has been unable to recently with his injury. Will also add cardio twice a week and will monitor BP at home with his mother's BP cuff.    History/Other:    No Further Nursing Notes to Review  Tobacco Reviewed  Allergies   Reviewed  Medications Reviewed  Problem List Reviewed  Medical History   Reviewed  Surgical History Reviewed  Family History Reviewed         Tobacco:  He has never smoked tobacco.    Current Outpatient Medications   Medication Sig Dispense Refill    sertraline 50 MG Oral Tab Take 1 tablet (50 mg total) by mouth daily. 90 tablet 3    triamcinolone 0.1 % External Cream Apply topically 2 (two) times daily as needed. 60 g 0    Multiple Vitamins Oral Tab Take 1 tablet by mouth daily.      Omega-3 Fatty Acids (FISH OIL OR) Take by mouth daily.       Review of Systems:  Review of Systems  10 point review of systems otherwise negative with the exception of HPI and assessment and plan    Objective:   BP (!) 134/92   Pulse 82   Ht 5' 7\" (1.702 m)   Wt 191 lb (86.6 kg)   SpO2 98%   BMI 29.91 kg/m²  Estimated body mass index is 29.91 kg/m² as calculated from the following:    Height as of this encounter: 5' 7\" (1.702 m).    Weight as of this encounter: 191 lb (86.6 kg).  Physical Exam  Vitals reviewed.   Constitutional:       General: He is not in acute distress.     Appearance: Normal appearance. He is well-developed.   Cardiovascular:      Rate and Rhythm: Normal rate.   Pulmonary:      Effort: Pulmonary effort is normal.    Skin:     General: Skin is warm and dry.      Comments: 5 interrupted sutures to base of right thumb removed   Wound C/D/I  Neurovascularly intact   Neurological:      Mental Status: He is alert and oriented to person, place, and time.       Assessment & Plan:   1. Encounter for removal of sutures (Primary)  5 intact sutures removed without complication   2. Elevated blood pressure reading without diagnosis of hypertension  Diet modification and exercise, monitor    PARESH Tucker, 9/9/2024, 1:06 PM

## 2024-09-11 ENCOUNTER — APPOINTMENT (OUTPATIENT)
Dept: PHYSICAL THERAPY | Age: 39
End: 2024-09-11
Attending: STUDENT IN AN ORGANIZED HEALTH CARE EDUCATION/TRAINING PROGRAM
Payer: COMMERCIAL

## 2024-09-18 ENCOUNTER — APPOINTMENT (OUTPATIENT)
Dept: PHYSICAL THERAPY | Age: 39
End: 2024-09-18
Attending: STUDENT IN AN ORGANIZED HEALTH CARE EDUCATION/TRAINING PROGRAM
Payer: COMMERCIAL

## 2024-10-02 ENCOUNTER — TELEPHONE (OUTPATIENT)
Dept: PHYSICAL THERAPY | Facility: HOSPITAL | Age: 39
End: 2024-10-02

## 2024-10-07 ENCOUNTER — OFFICE VISIT (OUTPATIENT)
Dept: PHYSICAL THERAPY | Age: 39
End: 2024-10-07
Attending: STUDENT IN AN ORGANIZED HEALTH CARE EDUCATION/TRAINING PROGRAM
Payer: COMMERCIAL

## 2024-10-07 PROCEDURE — 97140 MANUAL THERAPY 1/> REGIONS: CPT

## 2024-10-07 PROCEDURE — 97110 THERAPEUTIC EXERCISES: CPT

## 2024-10-07 NOTE — PROGRESS NOTES
Diagnosis:    Left carpal tunnel syndrome      Referring Provider:  SULLY PHAM Date of Evaluation:    8/7/2024    Precautions:  None Next MD visit:   none scheduled  Date of Surgery: n/a   Order Date:  7/15/2024  Authorizing Provider:   SULLY PHAM     Procedure:  OCCUPATIONAL THERAPY - INTERNAL [[id number]]         Order #:   [id number]  Qty:  1     Priority:  Routine                   Class:   IHP - RFL     Standing Interval:          Standing Occurrences:          Expires on:            Expected by:    Associated DX:  Left carpal tunnel syndrome (G56.02)     Order summary:  IHP - RFL, Routine, 10 visits  Occupational  Therapy Area of Concentration: Orthopedic  Occupational Therapy Ortho: UE  If the patient can be seen sooner with a PT vs an OT, can we cancel this order and replace with a PT order? Yes         Comments:      SUBJECTIVE: Pt states that now that he is not on the prednisone, he feels the numbness and tingling more.     PAIN LEVELS: 0/10      OBJECTIVE: See tx log for details.      ORTHOTICS: none      SENSORY: 2.83 (normal sensation) [place] Herbie Sensory test      CIRCUMFERENTIAL EDEMA (cm):  Right Wrist crease: 17.1  Left Wrist crease: 16.8    ROM: (* denotes performed with pain) WNL      Strength (lbs) Right Average Left Average   : 85 80   2 pt Pinch: 12 14   3 pt Pinch: 14 14   Lateral Pinch: 18 21         ASSESSMENT  Pt continues to have numbness and  tinging in hand as he is no longer taking prednisone. Pt advised to monitor body mechanics and to stretch to minimize symptoms. Noted tightness in hand and forearm musculature as pt is continuing to engage in repetitive activities.       Today’s Treatment and Response:   Treatment provided today in addition to the initial evaluation:   Date 8/7/2024  8/16/24  8/27/24  9/4/24 10/7/24   Visit # 1/6  2/6  3/6 4/6 5/6   Evaluation Initial  X          Manual            STM   x     x                             Ther ex             AROM with  fluidotherapy x 10\"   x  x   x    powerweb     stretches  x       flexbar perturbations    with wrist in neutral x 2 min  x  green  x    frisbee perturbations      CW/CCW x 2 min in each direction  x x    sponges       abd/add       digiflex        red x 3 min x    handmaster plus        x 3 min x               HEP issued See table below  x        Therapeutic Activity                                                                 Neuromuscular Re-education                              Modalities        U.S. 3.0Mhz, 1.0w/cm^2 x 8\", pulsed to CTS  #1 #2                     X= performed this date as previously outlined    Pt education was provided on exam findings, treatment diagnosis, treatment plan, expectations, and prognosis. Pt was also provided recommendations for body mechanics, splinting. Patient was instructed in and issued a HEP.      HEP Date issued  Date amended Date discharged  Comments (HEP2Go code if used)   Acute CTS [place] protocol 8/7/24      Wrist stretches 8/7/24      Median nerve glides 8/16/24                                          Goals: (to be met in 6 visits)  Increase (L)  by 4-5 lbs to allow pt to  tools at work.   Increase (L) 3 pt pinch by 2-3 lbs to allow pt to use scalpel.   Pt to verbalize 2/3 proper  principles with 100% accuracy.   Pt will be independent and compliant with comprehensive HEP to maintain progress achieved in OT    PLAN: Patient will be seen for 1-2 x/week or a total of 6 visits over a 90 day period.  Treatment will include: Manual Therapy, Neuromuscular Re-education, Self-Care Home Management, Therapeutic Activities, Therapeutic Exercise, Home Exercise Program instruction, and Modalities to include: Ultrasound        Charges: 1 MT, 2 TE       Total Timed Treatment: 45 min     Total Treatment Time: 45 min       Estefani Rodriguez OTR/L

## 2024-10-16 ENCOUNTER — APPOINTMENT (OUTPATIENT)
Dept: PHYSICAL THERAPY | Age: 39
End: 2024-10-16
Attending: STUDENT IN AN ORGANIZED HEALTH CARE EDUCATION/TRAINING PROGRAM
Payer: COMMERCIAL

## 2024-10-22 ENCOUNTER — OFFICE VISIT (OUTPATIENT)
Dept: PHYSICAL THERAPY | Age: 39
End: 2024-10-22
Attending: STUDENT IN AN ORGANIZED HEALTH CARE EDUCATION/TRAINING PROGRAM
Payer: COMMERCIAL

## 2024-10-22 PROCEDURE — 97140 MANUAL THERAPY 1/> REGIONS: CPT

## 2024-10-22 PROCEDURE — 97110 THERAPEUTIC EXERCISES: CPT

## 2024-10-22 PROCEDURE — 97035 APP MDLTY 1+ULTRASOUND EA 15: CPT

## 2024-10-22 NOTE — PROGRESS NOTES
Diagnosis:    Left carpal tunnel syndrome      Referring Provider:  SULLY PHAM Date of Evaluation:    8/7/2024    Precautions:  None Next MD visit:   none scheduled  Date of Surgery: n/a   Order Date:  7/15/2024  Authorizing Provider:   SULLY PHAM     Procedure:  OCCUPATIONAL THERAPY - INTERNAL [[id number]]         Order #:   [id number]  Qty:  1     Priority:  Routine                   Class:   IHP - RFL     Standing Interval:          Standing Occurrences:          Expires on:            Expected by:    Associated DX:  Left carpal tunnel syndrome (G56.02)     Order summary:  IHP - RFL, Routine, 10 visits  Occupational  Therapy Area of Concentration: Orthopedic  Occupational Therapy Ortho: UE  If the patient can be seen sooner with a PT vs an OT, can we cancel this order and replace with a PT order? Yes         Comments:      SUBJECTIVE: Pt states that his symptoms are no worse or no better.     PAIN LEVELS: 0/10      OBJECTIVE: See tx log for details.      ORTHOTICS: none      SENSORY: 2.83 (normal sensation) [place] Herbie Sensory test      CIRCUMFERENTIAL EDEMA (cm):  Right Wrist crease: 17.1  Left Wrist crease: 16.8    ROM: (* denotes performed with pain) WNL      Strength (lbs) Right Average Left Average   : 85 80   2 pt Pinch: 12 14   3 pt Pinch: 14 14   Lateral Pinch: 18 21         ASSESSMENT  No change in symptoms reported at this time. Advised pt to continue to wear splint at night to minimize symptoms and to perform stretches. Pt to discuss symptom management options with MD after course of therapy.      Today’s Treatment and Response:   Treatment provided today in addition to the initial evaluation:   Date 8/7/2024  8/16/24  8/27/24  9/4/24 10/7/24 10/22/24   Visit # 1/6  2/6  3/6  4/6 5/6 6   Evaluation Initial  X           Manual             STM   x     x x                               Ther ex              AROM with fluidotherapy x 10\"   x  x   x x    powerweb     stretches  x    x    flexbar  perturbations    with wrist in neutral x 2 min  x  green  x     frisbee perturbations      CW/CCW x 2 min in each direction  x x     sponges       abd/add        digiflex        red x 3 min x    Body blade      X 3 min    handmaster plus        x 3 min x    SB walk ups         X 3\"    HEP issued See table below  x         Therapeutic Activity                                                                        Neuromuscular Re-education                                 Modalities         U.S. 3.0Mhz, 1.0w/cm^2 x 8\", pulsed to CTS  #1 #2   #3                     X= performed this date as previously outlined    Pt education was provided on exam findings, treatment diagnosis, treatment plan, expectations, and prognosis. Pt was also provided recommendations for body mechanics, splinting. Patient was instructed in and issued a HEP.      HEP Date issued  Date amended Date discharged  Comments (HEP2Go code if used)   Acute CTS [place] protocol 8/7/24      Wrist stretches 8/7/24      Median nerve glides 8/16/24                                          Goals: (to be met in 6 visits)  Increase (L)  by 4-5 lbs to allow pt to  tools at work.   Increase (L) 3 pt pinch by 2-3 lbs to allow pt to use scalpel.   Pt to verbalize 2/3 proper  principles with 100% accuracy.   Pt will be independent and compliant with comprehensive HEP to maintain progress achieved in OT    PLAN: Patient will be seen for 1-2 x/week or a total of 6 visits over a 90 day period.  Treatment will include: Manual Therapy, Neuromuscular Re-education, Self-Care Home Management, Therapeutic Activities, Therapeutic Exercise, Home Exercise Program instruction, and Modalities to include: Ultrasound        Charges: 1 MT, 1 TE, 1US       Total Timed Treatment: 45 min     Total Treatment Time: 45 min       Estefani Rodriguez OTR/L

## 2024-10-25 ENCOUNTER — APPOINTMENT (OUTPATIENT)
Dept: PHYSICAL THERAPY | Age: 39
End: 2024-10-25
Attending: STUDENT IN AN ORGANIZED HEALTH CARE EDUCATION/TRAINING PROGRAM
Payer: COMMERCIAL

## 2024-10-25 ENCOUNTER — TELEPHONE (OUTPATIENT)
Dept: PHYSICAL THERAPY | Facility: HOSPITAL | Age: 39
End: 2024-10-25

## 2024-11-13 ENCOUNTER — OFFICE VISIT (OUTPATIENT)
Dept: PHYSICAL THERAPY | Age: 39
End: 2024-11-13
Attending: STUDENT IN AN ORGANIZED HEALTH CARE EDUCATION/TRAINING PROGRAM
Payer: COMMERCIAL

## 2024-11-13 PROCEDURE — 97140 MANUAL THERAPY 1/> REGIONS: CPT

## 2024-11-13 PROCEDURE — 97110 THERAPEUTIC EXERCISES: CPT

## 2024-11-13 NOTE — PROGRESS NOTES
Diagnosis:    Left carpal tunnel syndrome      Referring Provider:  SULLY PHAM Date of Evaluation:    8/7/2024    Precautions:  None Next MD visit:   none scheduled  Date of Surgery: n/a   Order Date:  7/15/2024  Authorizing Provider:   SULLY PHAM     Procedure:  OCCUPATIONAL THERAPY - INTERNAL [[id number]]         Order #:   [id number]  Qty:  1     Priority:  Routine                   Class:   IHP - RFL     Standing Interval:          Standing Occurrences:          Expires on:            Expected by:    Associated DX:  Left carpal tunnel syndrome (G56.02)     Order summary:  IHP - RFL, Routine, 10 visits  Occupational  Therapy Area of Concentration: Orthopedic  Occupational Therapy Ortho: UE  If the patient can be seen sooner with a PT vs an OT, can we cancel this order and replace with a PT order? Yes         Comments:      SUBJECTIVE: Pt states that he has less symptoms because his work load has reduced.      PAIN LEVELS: 0/10      OBJECTIVE: See tx log for details.      ORTHOTICS: none      SENSORY: 2.83 (normal sensation) [place] Herbie Sensory test      CIRCUMFERENTIAL EDEMA (cm):  Right Wrist crease: 17.1  Left Wrist crease: 16.8    ROM: (* denotes performed with pain) WNL      Strength (lbs) Right Average Left Average   : 85 80   2 pt Pinch: 12 14   3 pt Pinch: 14 14   Lateral Pinch: 18 21         ASSESSMENT  Decreased symptoms in hand reported as pt has reduced his work load.  Pt continuing to perform stretches and is wearing splint which has allowed pt to manage symptoms.      Today’s Treatment and Response:   Treatment provided today in addition to the initial evaluation:   Date 8/7/2024  8/16/24  8/27/24  9/4/24 10/7/24 10/22/24 11/13/24   Visit # 1/6  2/6  3/6  4/6 5/6 6 7   Evaluation Initial  X            Manual              STM   x     x x x                                 Ther ex               AROM with fluidotherapy x 10\"   x  x   x x x    powerweb     stretches  x    x Green x 3 min     flexbar perturbations    with wrist in neutral x 2 min  x  green  x  x    frisbee perturbations      CW/CCW x 2 min in each direction  x x      sponges       abd/add     With gripper at 3rd rung    digiflex        red x 3 min x  Green x 3 min   Body blade      X 3 min     handmaster plus        x 3 min x     SB walk ups         X 3\"     HEP issued See table below  x          Therapeutic Activity                                                                               Neuromuscular Re-education                                    Modalities          U.S. 3.0Mhz, 1.0w/cm^2 x 8\", pulsed to CTS  #1 #2   #3                        X= performed this date as previously outlined    Pt education was provided on exam findings, treatment diagnosis, treatment plan, expectations, and prognosis. Pt was also provided recommendations for body mechanics, splinting. Patient was instructed in and issued a HEP.      HEP Date issued  Date amended Date discharged  Comments (HEP2Go code if used)   Acute CTS [place] protocol 8/7/24      Wrist stretches 8/7/24      Median nerve glides 8/16/24                                          Goals: (to be met in 6 visits)  Increase (L)  by 4-5 lbs to allow pt to  tools at work.   Increase (L) 3 pt pinch by 2-3 lbs to allow pt to use scalpel.   Pt to verbalize 2/3 proper  principles with 100% accuracy.   Pt will be independent and compliant with comprehensive HEP to maintain progress achieved in OT    PLAN: Patient will be seen for 1-2 x/week or a total of 6 visits over a 90 day period.  Treatment will include: Manual Therapy, Neuromuscular Re-education, Self-Care Home Management, Therapeutic Activities, Therapeutic Exercise, Home Exercise Program instruction, and Modalities to include: Ultrasound        Charges: 1 MT, 2 TE       Total Timed Treatment: 45 min     Total Treatment Time: 45 min       Estefani RodriguezOTR/L

## 2024-11-27 ENCOUNTER — APPOINTMENT (OUTPATIENT)
Dept: PHYSICAL THERAPY | Age: 39
End: 2024-11-27
Attending: STUDENT IN AN ORGANIZED HEALTH CARE EDUCATION/TRAINING PROGRAM
Payer: COMMERCIAL

## 2024-12-02 ENCOUNTER — NURSE TRIAGE (OUTPATIENT)
Dept: INTERNAL MEDICINE CLINIC | Facility: CLINIC | Age: 39
End: 2024-12-02

## 2024-12-02 NOTE — TELEPHONE ENCOUNTER
Action Requested: Summary for Provider     []  Critical Lab, Recommendations Needed  [] Need Additional Advice  []   FYI    []   Need Orders  [] Need Medications Sent to Pharmacy  []  Other     SUMMARY: Patient requesting sooner appointment.    One month ago patient working out in gym lifting weights. Didn't feel any pain at that moment, but over time developed right buttock pain that radiated half way down right leg. Pain scale 4 at worst and 0 when nsaids, stretching, ice paks are done. Symptom worse in the mornings.     Scheduled patient with sooner appointment with Dr Miller 12/3/24 at 6:15 pm. Patient to continue home remedies and avoid weight lifting for now until evaluated by MD. Patient verbalized understanding and agreed with plan.    Please reply to pool: EM RN TRIAGE      Reason for call: Leg Pain  Onset: Data Unavailable                   Reason for Disposition   Injury is still painful or swollen after 2 weeks    Protocols used: Leg Injury-A-[place]

## 2024-12-03 ENCOUNTER — OFFICE VISIT (OUTPATIENT)
Dept: PHYSICAL THERAPY | Age: 39
End: 2024-12-03
Attending: STUDENT IN AN ORGANIZED HEALTH CARE EDUCATION/TRAINING PROGRAM
Payer: COMMERCIAL

## 2024-12-03 PROCEDURE — 97110 THERAPEUTIC EXERCISES: CPT

## 2024-12-03 PROCEDURE — 97140 MANUAL THERAPY 1/> REGIONS: CPT

## 2024-12-03 NOTE — PROGRESS NOTES
Diagnosis:    Left carpal tunnel syndrome      Referring Provider:  SULLY PHAM Date of Evaluation:    8/7/2024    Precautions:  None Next MD visit:   none scheduled  Date of Surgery: n/a   Order Date:  7/15/2024  Authorizing Provider:   SULLY PHAM     Procedure:  OCCUPATIONAL THERAPY - INTERNAL [[id number]]         Order #:   [id number]  Qty:  1     Priority:  Routine                   Class:   IHP - RFL     Standing Interval:          Standing Occurrences:          Expires on:            Expected by:    Associated DX:  Left carpal tunnel syndrome (G56.02)     Order summary:  IHP - RFL, Routine, 10 visits  Occupational  Therapy Area of Concentration: Orthopedic  Occupational Therapy Ortho: UE  If the patient can be seen sooner with a PT vs an OT, can we cancel this order and replace with a PT order? Yes         Comments:      SUBJECTIVE: Pt states that his symptoms are no better no worse.       PAIN LEVELS: 0/10      OBJECTIVE: See tx log for details.      ORTHOTICS: none      SENSORY: 2.83 (normal sensation) [place] Herbie Sensory test      CIRCUMFERENTIAL EDEMA (cm):  Right Wrist crease: 17.1  Left Wrist crease: 16.8    ROM: (* denotes performed with pain) WNL      Strength (lbs) Right Average Left Average   : 85 80   2 pt Pinch: 12 14   3 pt Pinch: 14 14   Lateral Pinch: 18 21         ASSESSMENT  Pt continues to have tightness in forearm and hands. Reviewed importance of stretching to minimize stress to nerve.  Pt having fluctuation with symptoms based on workload. Discussed importance of body mechanics modification to prevent symptoms from reoccurring even after medical management. Pt verbalized good understanding.       Today’s Treatment and Response:   Treatment provided today in addition to the initial evaluation:   Date 8/7/2024  8/16/24  8/27/24  9/4/24 10/7/24 10/22/24 11/13/24 12/3/24   Visit # 1/6  2/6  3/6  4/6 5/6 6 7 8   Evaluation Initial  X             Manual               STM   x     x x  x x                                   Ther ex                AROM with fluidotherapy x 10\"   x  x   x x x x    powerweb     stretches  x    x Green x 3 min x    flexbar perturbations    with wrist in neutral x 2 min  x  green  x  x     frisbee perturbations      CW/CCW x 2 min in each direction  x x       sponges       abd/add     With gripper at 3rd rung    Yoga poses        Thread the needle x 2 (B)    digiflex        red x 3 min x  Green x 3 min    Body blade      X 3 min  x    handmaster plus        x 3 min x      SB walk ups         X 3\"  x    HEP issued See table below  x           Therapeutic Activity                                                                                      Neuromuscular Re-education                                       Modalities           U.S. 3.0Mhz, 1.0w/cm^2 x 8\", pulsed to CTS  #1 #2   #3                           X= performed this date as previously outlined    Pt education was provided on exam findings, treatment diagnosis, treatment plan, expectations, and prognosis. Pt was also provided recommendations for body mechanics, splinting. Patient was instructed in and issued a HEP.      HEP Date issued  Date amended Date discharged  Comments (HEP2Go code if used)   Acute CTS [place] protocol 8/7/24      Wrist stretches 8/7/24      Median nerve glides 8/16/24                                          Goals: (to be met in 6 visits)  Increase (L)  by 4-5 lbs to allow pt to  tools at work.   Increase (L) 3 pt pinch by 2-3 lbs to allow pt to use scalpel.   Pt to verbalize 2/3 proper  principles with 100% accuracy.   Pt will be independent and compliant with comprehensive HEP to maintain progress achieved in OT    PLAN: Patient will be seen for 1-2 x/week or a total of 6 visits over a 90 day period.  Treatment will include: Manual Therapy, Neuromuscular Re-education, Self-Care Home Management, Therapeutic Activities, Therapeutic Exercise, Home Exercise Program  instruction, and Modalities to include: Ultrasound        Charges: 1 MT, 2 TE       Total Timed Treatment: 45 min     Total Treatment Time: 45 min       Estefani Rodriguez,OTR/L

## 2024-12-04 ENCOUNTER — OFFICE VISIT (OUTPATIENT)
Dept: PHYSICAL THERAPY | Age: 39
End: 2024-12-04
Attending: INTERNAL MEDICINE
Payer: COMMERCIAL

## 2024-12-04 PROCEDURE — 97110 THERAPEUTIC EXERCISES: CPT

## 2024-12-04 NOTE — PROGRESS NOTES
Diagnosis:    Left carpal tunnel syndrome      Referring Provider:  SULLY PHAM Date of Evaluation:    8/7/2024    Precautions:  None Next MD visit:   none scheduled  Date of Surgery: n/a   Order Date:  7/15/2024  Authorizing Provider:   SULLY PHAM     Procedure:  OCCUPATIONAL THERAPY - INTERNAL [[id number]]         Order #:   [id number]  Qty:  1     Priority:  Routine                   Class:   IHP - RFL     Standing Interval:          Standing Occurrences:          Expires on:            Expected by:    Associated DX:  Left carpal tunnel syndrome (G56.02)     Order summary:  IHP - RFL, Routine, 10 visits  Occupational  Therapy Area of Concentration: Orthopedic  Occupational Therapy Ortho: UE  If the patient can be seen sooner with a PT vs an OT, can we cancel this order and replace with a PT order? Yes         Comments:      SUBJECTIVE:  Pt states that the stretches really helped.         PAIN LEVELS: 0/10      OBJECTIVE: See tx log for details.      ORTHOTICS: none      SENSORY: 2.83 (normal sensation) [place] Herbie Sensory test      CIRCUMFERENTIAL EDEMA (cm):  Right Wrist crease: 17.1  Left Wrist crease: 16.8    ROM: (* denotes performed with pain) WNL      Strength (lbs) Right Average Left Average   : 85 80   2 pt Pinch: 12 14   3 pt Pinch: 14 14   Lateral Pinch: 18 21         ASSESSMENT  Decrease in symptoms reported as pt was performing stretches. Issued HEP consisting of stretches for median nerve pathway. Pt demonstrated good understanding of HEP.  Advised pt to perform stretches and median nerve glides consistently to manage symptoms. Pt agreed.      Today’s Treatment and Response:   Treatment provided today in addition to the initial evaluation:   Date  8/27/24  9/4/24 10/7/24 10/22/24 11/13/24 12/3/24 12/4/24   Visit #  3/6  4/6 5/6 6 7 8 9   Evaluation            Manual            STM     x x x x                              Ther ex             AROM with fluidotherapy x 10\"  x   x x x x x     powerweb   x    x Green x 3 min x x    flexbar perturbations  x  green  x  x      frisbee perturbations  CW/CCW x 2 min in each direction  x x        sponges   abd/add     With gripper at 3rd rung     Yoga poses      Thread the needle x 2 (B)     digiflex    red x 3 min x  Green x 3 min     Body blade    X 3 min  x     handmaster plus    x 3 min x       SB walk ups      X 3\"  x     HEP issued         X 10   Therapeutic Activity                                                                           Neuromuscular Re-education                                Modalities          U.S. 3.0Mhz, 1.0w/cm^2 x 8\", pulsed to CTS #2   #3                          X= performed this date as previously outlined    Pt education was provided on exam findings, treatment diagnosis, treatment plan, expectations, and prognosis. Pt was also provided recommendations for body mechanics, splinting. Patient was instructed in and issued a HEP.      HEP Date issued  Date amended Date discharged  Comments (HEP2Go code if used)   Acute CTS [place] protocol 8/7/24      Wrist stretches 8/7/24      Median nerve glides 8/16/24      Carpal tunnel stretches 12/4/24   [facility]                                Goals: (to be met in 6 visits)  Increase (L)  by 4-5 lbs to allow pt to  tools at work.   Increase (L) 3 pt pinch by 2-3 lbs to allow pt to use scalpel.   Pt to verbalize 2/3 proper  principles with 100% accuracy.   Pt will be independent and compliant with comprehensive HEP to maintain progress achieved in OT    PLAN: Patient will be seen for 1-2 x/week or a total of 6 visits over a 90 day period.  Treatment will include: Manual Therapy, Neuromuscular Re-education, Self-Care Home Management, Therapeutic Activities, Therapeutic Exercise, Home Exercise Program instruction, and Modalities to include: Ultrasound        Charges: 3 TE       Total Timed Treatment: 45 min     Total Treatment Time: 45 min       Estefani RodriguezOTR/L

## 2024-12-11 ENCOUNTER — TELEPHONE (OUTPATIENT)
Dept: PHYSICAL THERAPY | Facility: HOSPITAL | Age: 39
End: 2024-12-11

## 2024-12-11 ENCOUNTER — APPOINTMENT (OUTPATIENT)
Dept: PHYSICAL THERAPY | Age: 39
End: 2024-12-11
Attending: INTERNAL MEDICINE
Payer: COMMERCIAL

## 2025-07-09 NOTE — TELEPHONE ENCOUNTER
Please call patient to make an appointment.  Thank you   Annual Physical Exam overdue  MyChart Message sent to patient

## 2025-07-09 NOTE — TELEPHONE ENCOUNTER
Please review.  Protocol failed / Has no protocol.    [email or web address] message sent to patient to schedule an office visit with Primary Care Provider.   Will also route to Call Center to make a phone attempt.      Requested Prescriptions   Pending Prescriptions Disp Refills    SERTRALINE 50 MG Oral Tab [Pharmacy Med Name: SERTRALINE 50MG TABLETS] 90 tablet 0     Sig: TAKE 1 TABLET(50 MG) BY MOUTH DAILY  **Appointment needed for further refills.         Psychiatric Non-Scheduled (Anti-Anxiety) Failed - 7/9/2025 12:04 PM        Failed - In person appointment or virtual visit in the past 6 mos or appointment in next 3 mos        Failed - Depression Screening completed within the past 12 months        Passed - Medication is active on med list

## (undated) NOTE — MR AVS SNAPSHOT
[place] [address]  [phone]               Thank you for choosing us for your health care visit with Arsalan Mckinney MD.  We are glad to serve you and happy to provide you with this summar Summaries. If you've been to the Emergency Department or your doctor's office, you can view your past visit information in [employer] by going to Visits < Visit Summaries. [employer] questions? Call [phone] for help.   [employer] is NOT to be used for urge

## (undated) NOTE — Clinical Note
Dear Lety,  I had the opportunity to see your patient Sandro Conde for an EMG recently. I appreciate your confidence in me to care for your patients. Please feel free call me with any questions at [phone] or contact me through [employer].  Sincerely, Franco Paniagua MD Board Certified, Physical Medicine and Rehabilitation Specializing in Sports Medicine, Spine Medicine and Electrodiagnostic Medicine [facility]  CC: Dr. Miller

## (undated) NOTE — LETTER
9/5/2024          To Whom It May Concern:    Sandro Conde is currently under my medical care and may not return to work at this time.    Please excuse Sandro from 9/3/24 to.9/8/24.  He may return to work on 9/11/2024.  Activity is restricted as follows: none.    If you require additional information please contact our office.        Sincerely,    PARESH Tucker          Document generated by:  PARESH Tucker